# Patient Record
Sex: FEMALE | Employment: UNEMPLOYED | URBAN - METROPOLITAN AREA
[De-identification: names, ages, dates, MRNs, and addresses within clinical notes are randomized per-mention and may not be internally consistent; named-entity substitution may affect disease eponyms.]

---

## 2019-06-25 ENCOUNTER — OFFICE VISIT (OUTPATIENT)
Dept: INTERNAL MEDICINE CLINIC | Age: 24
End: 2019-06-25

## 2019-06-25 VITALS
SYSTOLIC BLOOD PRESSURE: 111 MMHG | TEMPERATURE: 98.5 F | BODY MASS INDEX: 33.43 KG/M2 | DIASTOLIC BLOOD PRESSURE: 74 MMHG | RESPIRATION RATE: 16 BRPM | HEART RATE: 90 BPM | WEIGHT: 208 LBS | HEIGHT: 66 IN | OXYGEN SATURATION: 96 %

## 2019-06-25 DIAGNOSIS — Z87.01 H/O RECURRENT PNEUMONIA: Primary | ICD-10-CM

## 2019-06-25 DIAGNOSIS — J45.909 UNCOMPLICATED ASTHMA, UNSPECIFIED ASTHMA SEVERITY, UNSPECIFIED WHETHER PERSISTENT: ICD-10-CM

## 2019-06-25 RX ORDER — ALBUTEROL SULFATE 90 UG/1
AEROSOL, METERED RESPIRATORY (INHALATION)
COMMUNITY
End: 2019-07-23 | Stop reason: SDUPTHER

## 2019-06-25 RX ORDER — MONTELUKAST SODIUM 10 MG/1
10 TABLET ORAL DAILY
COMMUNITY
End: 2019-07-23 | Stop reason: SDUPTHER

## 2019-06-25 RX ORDER — FLUTICASONE PROPIONATE AND SALMETEROL 250; 50 UG/1; UG/1
1 POWDER RESPIRATORY (INHALATION) EVERY 12 HOURS
COMMUNITY
End: 2019-06-25 | Stop reason: SDUPTHER

## 2019-06-25 RX ORDER — NORGESTIMATE AND ETHINYL ESTRADIOL 0.25-0.035
1 KIT ORAL DAILY
COMMUNITY
End: 2019-07-19 | Stop reason: SDUPTHER

## 2019-06-25 RX ORDER — FLUTICASONE PROPIONATE AND SALMETEROL 250; 50 UG/1; UG/1
1 POWDER RESPIRATORY (INHALATION) EVERY 12 HOURS
Qty: 1 EACH | Refills: 3 | Status: SHIPPED | OUTPATIENT
Start: 2019-06-25 | End: 2019-07-23 | Stop reason: SDUPTHER

## 2019-06-25 NOTE — PROGRESS NOTES
Chief Complaint   Patient presents with    Asthma     Establishing with new PCP. 1. Have you been to the ER, urgent care clinic since your last visit? Hospitalized since your last visit? No    2. Have you seen or consulted any other health care providers outside of the [address] [place] [address] since your last visit? Include any pap smears or colon screening.  No     3 most recent PHQ Screens 6/25/2019   Little interest or pleasure in doing things Not at all   Feeling down, depressed, irritable, or hopeless Not at all   Total Score PHQ 2 0

## 2019-06-29 NOTE — PROGRESS NOTES
HISTORY OF PRESENT ILLNESS  Olga Aleman is a 24 y.o. female. HPI   Pt is here to establish care and for a referral to pulmonology for recurrent pneumonia. She has been told she has asthma and has been on advair for awhile but the past year she has had pneumonia 3x and the ER advised her she needed to find a PCP and see a pulmonologist. She currently denies any symptoms other than a slight wheeze. Allergies   Allergen Reactions    Amoxicillin Rash       Current Outpatient Medications   Medication Sig    albuterol (VENTOLIN HFA) 90 mcg/actuation inhaler Take  by inhalation.  montelukast (SINGULAIR) 10 mg tablet Take 10 mg by mouth daily.  norgestimate-ethinyl estradiol (SPRINTEC, 28,) 0.25-35 mg-mcg tab Take 1 Tab by mouth daily.  fluticasone propion-salmeterol (ADVAIR DISKUS) 250-50 mcg/dose diskus inhaler Take 1 Puff by inhalation every twelve (12) hours. No current facility-administered medications for this visit. Review of Systems   Constitutional: Negative. Negative for chills, fever and malaise/fatigue. HENT: Negative. Negative for congestion, ear pain, sore throat and tinnitus. Eyes: Negative. Negative for blurred vision, double vision and photophobia. Respiratory: Positive for cough and wheezing. Negative for shortness of breath. Cardiovascular: Negative. Negative for chest pain, palpitations and leg swelling. Gastrointestinal: Negative. Negative for abdominal pain, heartburn, nausea and vomiting. Genitourinary: Negative. Negative for dysuria, frequency, hematuria and urgency. Musculoskeletal: Negative. Negative for back pain, joint pain, myalgias and neck pain. Skin: Negative. Negative for itching and rash. Neurological: Negative. Negative for dizziness, tingling, tremors and headaches. Endo/Heme/Allergies: Positive for environmental allergies. Psychiatric/Behavioral: Negative. Negative for depression and memory loss.  The patient is not nervous/anxious and does not have insomnia. Visit Vitals  /74 (BP 1 Location: Right arm, BP Patient Position: Sitting)   Pulse 90   Temp 98.5 °F (36.9 °C) (Oral)   Resp 16   Ht 5' 6\" (1.676 m)   Wt 208 lb (94.3 kg)   SpO2 96%   BMI 33.57 kg/m²       Physical Exam   Constitutional: She is oriented to person, place, and time. She appears well-developed and well-nourished. No distress. HENT:   Head: Normocephalic and atraumatic. Right Ear: External ear normal.   Left Ear: External ear normal.   Nose: Nose normal.   Mouth/Throat: Oropharynx is clear and moist. No oropharyngeal exudate. Cardiovascular: Normal rate, regular rhythm and normal heart sounds. Pulmonary/Chest: Effort normal. No respiratory distress. She has wheezes (diffuse). She has no rales. Neurological: She is alert and oriented to person, place, and time. Skin: Skin is warm and dry. She is not diaphoretic. Psychiatric: She has a normal mood and affect. Her behavior is normal.       ASSESSMENT and PLAN    ICD-10-CM ICD-9-CM    1. H/O recurrent pneumonia Z87.01 V12.61 REFERRAL TO PULMONARY DISEASE   2. Uncomplicated asthma, unspecified asthma severity, unspecified whether persistent J45.909 493.90 fluticasone propion-salmeterol (ADVAIR DISKUS) 250-50 mcg/dose diskus inhaler     Follow-up and Dispositions    · Return if symptoms worsen or fail to improve. Pt expressed understanding of visit summary and plans for any follow ups or referrals as well as any medications prescribed.

## 2019-06-29 NOTE — PATIENT INSTRUCTIONS
Pneumonia: Care Instructions  Your Care Instructions    Pneumonia is an infection of the lungs. Most cases are caused by infections from bacteria or viruses. Pneumonia may be mild or very severe. If it is caused by bacteria, you will be treated with antibiotics. It may take a few weeks to a few months to recover fully from pneumonia, depending on how sick you were and whether your overall health is good. Follow-up care is a key part of your treatment and safety. Be sure to make and go to all appointments, and call your doctor if you are having problems. It's also a good idea to know your test results and keep a list of the medicines you take. How can you care for yourself at home? · Take your antibiotics exactly as directed. Do not stop taking the medicine just because you are feeling better. You need to take the full course of antibiotics. · Take your medicines exactly as prescribed. Call your doctor if you think you are having a problem with your medicine. · Get plenty of rest and sleep. You may feel weak and tired for a while, but your energy level will improve with time. · To prevent dehydration, drink plenty of fluids, enough so that your urine is light yellow or clear like water. Choose water and other caffeine-free clear liquids until you feel better. If you have kidney, heart, or liver disease and have to limit fluids, talk with your doctor before you increase the amount of fluids you drink. · Take care of your cough so you can rest. A cough that brings up mucus from your lungs is common with pneumonia. It is one way your body gets rid of the infection. But if coughing keeps you from resting or causes severe fatigue and chest-wall pain, talk to your doctor. He or she may suggest that you take a medicine to reduce the cough. · Use a vaporizer or humidifier to add moisture to your bedroom. Follow the directions for cleaning the machine. · Do not smoke or allow others to smoke around you.  Smoke will make your cough last longer. If you need help quitting, talk to your doctor about stop-smoking programs and medicines. These can increase your chances of quitting for good. · Take an over-the-counter pain medicine, such as acetaminophen (Tylenol), ibuprofen (Advil, Motrin), or naproxen (Aleve). Read and follow all instructions on the label. · Do not take two or more pain medicines at the same time unless the doctor told you to. Many pain medicines have acetaminophen, which is Tylenol. Too much acetaminophen (Tylenol) can be harmful. · If you were given a spirometer to measure how well your lungs are working, use it as instructed. This can help your doctor tell how your recovery is going. · To prevent pneumonia in the future, talk to your doctor about getting a flu vaccine (once a year) and a pneumococcal vaccine (one time only for most people). When should you call for help? Call 911 anytime you think you may need emergency care. For example, call if:    · You have severe trouble breathing.    Call your doctor now or seek immediate medical care if:    · You cough up dark brown or bloody mucus (sputum).     · You have new or worse trouble breathing.     · You are dizzy or lightheaded, or you feel like you may faint.    Watch closely for changes in your health, and be sure to contact your doctor if:    · You have a new or higher fever.     · You are coughing more deeply or more often.     · You are not getting better after 2 days (48 hours).     · You do not get better as expected. Where can you learn more? Go to http://amy-jon.info/. Enter [id number] in the search box to learn more about \"Pneumonia: Care Instructions. \"  Current as of: September 5, 2018  Content Version: 11.9  © 8953-1388 [employer]. Care instructions adapted under license by [employer] (which disclaims liability or warranty for this information).  If you have questions about a medical condition or this instruction, always ask your healthcare professional. [place] [address] any warranty or liability for your use of this information.

## 2019-07-19 RX ORDER — NORGESTIMATE AND ETHINYL ESTRADIOL 0.25-0.035
1 KIT ORAL DAILY
Qty: 1 PACKAGE | Refills: 3 | Status: SHIPPED | OUTPATIENT
Start: 2019-07-19 | End: 2019-11-07 | Stop reason: SDUPTHER

## 2019-07-23 ENCOUNTER — OFFICE VISIT (OUTPATIENT)
Dept: INTERNAL MEDICINE CLINIC | Age: 24
End: 2019-07-23

## 2019-07-23 VITALS
TEMPERATURE: 98.2 F | WEIGHT: 203 LBS | OXYGEN SATURATION: 95 % | DIASTOLIC BLOOD PRESSURE: 72 MMHG | SYSTOLIC BLOOD PRESSURE: 105 MMHG | HEART RATE: 97 BPM | BODY MASS INDEX: 32.62 KG/M2 | HEIGHT: 66 IN | RESPIRATION RATE: 16 BRPM

## 2019-07-23 DIAGNOSIS — J45.909 UNCOMPLICATED ASTHMA, UNSPECIFIED ASTHMA SEVERITY, UNSPECIFIED WHETHER PERSISTENT: ICD-10-CM

## 2019-07-23 DIAGNOSIS — K59.00 CONSTIPATION, UNSPECIFIED CONSTIPATION TYPE: Primary | ICD-10-CM

## 2019-07-23 RX ORDER — MONTELUKAST SODIUM 10 MG/1
10 TABLET ORAL DAILY
Qty: 30 TAB | Refills: 11 | Status: SHIPPED | OUTPATIENT
Start: 2019-07-23 | End: 2019-07-23 | Stop reason: SDUPTHER

## 2019-07-23 RX ORDER — ALBUTEROL SULFATE 90 UG/1
2 AEROSOL, METERED RESPIRATORY (INHALATION)
Qty: 1 INHALER | Refills: 11 | Status: SHIPPED | OUTPATIENT
Start: 2019-07-23 | End: 2020-07-27 | Stop reason: SDUPTHER

## 2019-07-23 RX ORDER — LACTULOSE 10 G/15ML
10 SOLUTION ORAL; RECTAL 2 TIMES DAILY
Qty: 480 ML | Refills: 1 | Status: SHIPPED | OUTPATIENT
Start: 2019-07-23 | End: 2020-04-06

## 2019-07-23 NOTE — PROGRESS NOTES
Chief Complaint   Patient presents with    Constipation     Took Miralax, working but still feels like she has stool in bowels.  Cough     Still waiting to go to Pulmonary, gave wrong fax number. Coughing and having SOB. 1. Have you been to the ER, urgent care clinic since your last visit? Hospitalized since your last visit? No    2. Have you seen or consulted any other health care providers outside of the [address] [place] [address] since your last visit? Include any pap smears or colon screening.  No

## 2019-07-24 RX ORDER — FLUTICASONE PROPIONATE AND SALMETEROL 250; 50 UG/1; UG/1
1 POWDER RESPIRATORY (INHALATION) EVERY 12 HOURS
Qty: 1 EACH | Refills: 3 | Status: SHIPPED | OUTPATIENT
Start: 2019-07-24 | End: 2020-11-06 | Stop reason: DRUGHIGH

## 2019-07-24 RX ORDER — MONTELUKAST SODIUM 10 MG/1
10 TABLET ORAL DAILY
Qty: 30 TAB | Refills: 11 | Status: SHIPPED | OUTPATIENT
Start: 2019-07-24 | End: 2021-11-15 | Stop reason: SDUPTHER

## 2019-07-26 NOTE — PATIENT INSTRUCTIONS
Constipation: Care Instructions  Your Care Instructions    Constipation means that you have a hard time passing stools (bowel movements). People pass stools from 3 times a day to once every 3 days. What is normal for you may be different. Constipation may occur with pain in the rectum and cramping. The pain may get worse when you try to pass stools. Sometimes there are small amounts of bright red blood on toilet paper or the surface of stools. This is because of enlarged veins near the rectum (hemorrhoids). A few changes in your diet and lifestyle may help you avoid ongoing constipation. Your doctor may also prescribe medicine to help loosen your stool. Some medicines can cause constipation. These include pain medicines and antidepressants. Tell your doctor about all the medicines you take. Your doctor may want to make a medicine change to ease your symptoms. Follow-up care is a key part of your treatment and safety. Be sure to make and go to all appointments, and call your doctor if you are having problems. It's also a good idea to know your test results and keep a list of the medicines you take. How can you care for yourself at home? · Drink plenty of fluids, enough so that your urine is light yellow or clear like water. If you have kidney, heart, or liver disease and have to limit fluids, talk with your doctor before you increase the amount of fluids you drink. · Include high-fiber foods in your diet each day. These include fruits, vegetables, beans, and whole grains. · Get at least 30 minutes of exercise on most days of the week. Walking is a good choice. You also may want to do other activities, such as running, swimming, cycling, or playing tennis or team sports. · Take a fiber supplement, such as Citrucel or Metamucil, every day. Read and follow all instructions on the label. · Schedule time each day for a bowel movement. A daily routine may help.  Take your time having your bowel movement. · Support your feet with a small step stool when you sit on the toilet. This helps flex your hips and places your pelvis in a squatting position. · Your doctor may recommend an over-the-counter laxative to relieve your constipation. Examples are Milk of Magnesia and MiraLax. Read and follow all instructions on the label. Do not use laxatives on a long-term basis. When should you call for help? Call your doctor now or seek immediate medical care if:    · You have new or worse belly pain.     · You have new or worse nausea or vomiting.     · You have blood in your stools.    Watch closely for changes in your health, and be sure to contact your doctor if:    · Your constipation is getting worse.     · You do not get better as expected. Where can you learn more? Go to http://amy-jon.info/. Enter 21 [id number] in the search box to learn more about \"Constipation: Care Instructions. \"  Current as of: September 23, 2018  Content Version: 12.1  © 6309-7981 [employer]. Care instructions adapted under license by [employer] (which disclaims liability or warranty for this information). If you have questions about a medical condition or this instruction, always ask your healthcare professional. [address] any warranty or liability for your use of this information.

## 2019-07-26 NOTE — PROGRESS NOTES
HISTORY OF PRESENT ILLNESS  Tatiana Jaqueline Merlin is a 24 y.o. female. HPI   Pt is here for constipation for the past 1-2 weeks. She has been taking mirilax every few days and increasing her water with no relief. Has not tried anything else. Denies abd or back pain, fever, chills, rash, HA, dizziness, bloody or black stools, and N/V. She also needs a refill on her inhaler and still has not heard from pulmonology. Allergies   Allergen Reactions    Amoxicillin Rash       Current Outpatient Medications   Medication Sig    lactulose (CHRONULAC) 10 gram/15 mL solution Take 15 mL by mouth two (2) times a day.  albuterol (VENTOLIN HFA) 90 mcg/actuation inhaler Take 2 Puffs by inhalation every four (4) hours as needed for Wheezing.  norgestimate-ethinyl estradiol (SPRINTEC, 28,) 0.25-35 mg-mcg tab Take 1 Tab by mouth daily.  fluticasone propion-salmeterol (ADVAIR DISKUS) 250-50 mcg/dose diskus inhaler Take 1 Puff by inhalation every twelve (12) hours.  montelukast (SINGULAIR) 10 mg tablet Take 1 Tab by mouth daily. No current facility-administered medications for this visit. Review of Systems   Constitutional: Negative. Negative for chills, fever and malaise/fatigue. HENT: Negative. Negative for congestion, ear pain, sore throat and tinnitus. Eyes: Negative. Negative for blurred vision, double vision and photophobia. Respiratory: Positive for cough and wheezing. Negative for shortness of breath. Cardiovascular: Negative. Negative for chest pain, palpitations and leg swelling. Gastrointestinal: Positive for constipation. Negative for abdominal pain, blood in stool, diarrhea, heartburn, melena, nausea and vomiting. Genitourinary: Negative. Negative for dysuria, frequency, hematuria and urgency. Musculoskeletal: Negative. Negative for back pain, joint pain, myalgias and neck pain. Skin: Negative. Negative for itching and rash. Neurological: Negative.   Negative for dizziness, tingling, tremors and headaches. Endo/Heme/Allergies: Positive for environmental allergies. Psychiatric/Behavioral: Negative. Negative for depression and memory loss. The patient is not nervous/anxious and does not have insomnia. Visit Vitals  /72 (BP 1 Location: Left arm, BP Patient Position: Sitting)   Pulse 97   Temp 98.2 °F (36.8 °C) (Oral)   Resp 16   Ht 5' 6\" (1.676 m)   Wt 203 lb (92.1 kg)   SpO2 95%   BMI 32.77 kg/m²       Physical Exam   Constitutional: She is oriented to person, place, and time. She appears well-developed and well-nourished. No distress. HENT:   Head: Normocephalic and atraumatic. Right Ear: External ear normal.   Left Ear: External ear normal.   Nose: Nose normal.   Mouth/Throat: Oropharynx is clear and moist. No oropharyngeal exudate. Cardiovascular: Normal rate, regular rhythm and normal heart sounds. Pulmonary/Chest: Effort normal. No respiratory distress. She has wheezes (diffuse). She has no rales. Abdominal: Soft. There is no tenderness. Neurological: She is alert and oriented to person, place, and time. Skin: Skin is warm and dry. She is not diaphoretic. Psychiatric: She has a normal mood and affect. Her behavior is normal.       ASSESSMENT and PLAN  Will try mirilax daily, increase fiber and water      ICD-10-CM ICD-9-CM    1. Constipation, unspecified constipation type K59.00 564.00 lactulose (CHRONULAC) 10 gram/15 mL solution   2. Uncomplicated asthma, unspecified asthma severity, unspecified whether persistent J45.909 493.90 albuterol (VENTOLIN HFA) 90 mcg/actuation inhaler      DISCONTINUED: montelukast (SINGULAIR) 10 mg tablet     Follow-up and Dispositions    · Return if symptoms worsen or fail to improve. Pt expressed understanding of visit summary and plans for any follow ups or referrals as well as any medications prescribed.

## 2019-11-07 ENCOUNTER — PATIENT MESSAGE (OUTPATIENT)
Dept: INTERNAL MEDICINE CLINIC | Age: 24
End: 2019-11-07

## 2019-11-07 NOTE — TELEPHONE ENCOUNTER
Please see encounters for messages. Pt has been requesting appt for medication refill with no response these messages goes to the .  I did sent pt a mychart message back but she may need a refill until she is able to get in

## 2019-11-08 RX ORDER — NORGESTIMATE AND ETHINYL ESTRADIOL 0.25-0.035
1 KIT ORAL DAILY
Qty: 1 PACKAGE | Refills: 0 | Status: SHIPPED | OUTPATIENT
Start: 2019-11-08 | End: 2019-12-12 | Stop reason: SDUPTHER

## 2019-12-04 ENCOUNTER — OFFICE VISIT (OUTPATIENT)
Dept: INTERNAL MEDICINE CLINIC | Age: 24
End: 2019-12-04

## 2019-12-04 VITALS
OXYGEN SATURATION: 97 % | DIASTOLIC BLOOD PRESSURE: 69 MMHG | RESPIRATION RATE: 16 BRPM | TEMPERATURE: 99.4 F | HEART RATE: 115 BPM | BODY MASS INDEX: 33.59 KG/M2 | HEIGHT: 66 IN | WEIGHT: 209 LBS | SYSTOLIC BLOOD PRESSURE: 106 MMHG

## 2019-12-04 DIAGNOSIS — R68.89 FLU-LIKE SYMPTOMS: Primary | ICD-10-CM

## 2019-12-04 DIAGNOSIS — J40 BRONCHITIS: ICD-10-CM

## 2019-12-04 LAB
FLUAV+FLUBV AG NOSE QL IA.RAPID: NEGATIVE POS/NEG
FLUAV+FLUBV AG NOSE QL IA.RAPID: NEGATIVE POS/NEG
VALID INTERNAL CONTROL?: YES

## 2019-12-04 RX ORDER — AZITHROMYCIN 250 MG/1
TABLET, FILM COATED ORAL
Qty: 6 TAB | Refills: 0 | Status: SHIPPED | OUTPATIENT
Start: 2019-12-04 | End: 2020-04-06

## 2019-12-04 NOTE — PROGRESS NOTES
Chief Complaint   Patient presents with    Cold Symptoms     Started Saturday, sinus congestion and pressure. Cough, sometimes productive. Fever and chills, bodyaches. 1. Have you been to the ER, urgent care clinic since your last visit? Hospitalized since your last visit? No    2. Have you seen or consulted any other health care providers outside of the [address] [place] [address] since your last visit? Include any pap smears or colon screening.  No

## 2019-12-04 NOTE — LETTER
NOTIFICATION RETURN TO WORK  
 
12/4/2019 5:33 PM 
 
Ms. Bernadine Decker [address] [address] To Whom It May Concern: 
 
Remedios Miranda is currently under the care of [address] [place] [address]. She will return to work on: 12/06/2019. If there are questions or concerns please have the patient contact our office.  
 
 
 
Sincerely, 
 
 
Beryl Gallegos PA-C

## 2019-12-08 NOTE — PATIENT INSTRUCTIONS

## 2019-12-08 NOTE — PROGRESS NOTES
HISTORY OF PRESENT ILLNESS  Remedios Caceres is a 21 y.o. female. HPI   Pr c/o sinus congestion, fever, chills, cough, wheezing, and myalgias x 3 days. Denies CP, SOB, otalgia, dizziness, and N/V/D    Allergies   Allergen Reactions    Amoxicillin Rash       Current Outpatient Medications   Medication Sig    azithromycin (ZITHROMAX Z-WEI) 250 mg tablet Take two tablets today then one tablet daily    norgestimate-ethinyl estradiol (SPRINTEC, 28,) 0.25-35 mg-mcg tab Take 1 Tab by mouth daily.  fluticasone propion-salmeterol (ADVAIR DISKUS) 250-50 mcg/dose diskus inhaler Take 1 Puff by inhalation every twelve (12) hours.  montelukast (SINGULAIR) 10 mg tablet Take 1 Tab by mouth daily.  albuterol (VENTOLIN HFA) 90 mcg/actuation inhaler Take 2 Puffs by inhalation every four (4) hours as needed for Wheezing.  lactulose (CHRONULAC) 10 gram/15 mL solution Take 15 mL by mouth two (2) times a day. No current facility-administered medications for this visit. Review of Systems   Constitutional: Positive for chills, fever and malaise/fatigue. HENT: Positive for congestion. Negative for ear pain and sore throat. Respiratory: Positive for cough and wheezing. Negative for sputum production and shortness of breath. Cardiovascular: Negative. Negative for chest pain, palpitations and leg swelling. Genitourinary: Negative. Musculoskeletal: Negative. Skin: Negative. Negative for itching and rash. Neurological: Negative. Negative for headaches. Psychiatric/Behavioral: Negative. Visit Vitals  /69 (BP 1 Location: Left arm, BP Patient Position: Sitting)   Pulse (!) 115   Temp 99.4 °F (37.4 °C) (Oral)   Resp 16   Ht 5' 6\" (1.676 m)   Wt 209 lb (94.8 kg)   SpO2 97%   BMI 33.73 kg/m²       Physical Exam  Constitutional:       General: She is not in acute distress. Appearance: She is well-developed. She is not diaphoretic. HENT:      Head: Normocephalic and atraumatic. Right Ear: Tympanic membrane, ear canal and external ear normal.      Left Ear: Tympanic membrane, ear canal and external ear normal.      Nose: Mucosal edema and rhinorrhea present. Right Sinus: No maxillary sinus tenderness or frontal sinus tenderness. Left Sinus: No maxillary sinus tenderness or frontal sinus tenderness. Mouth/Throat:      Pharynx: Uvula midline. Posterior oropharyngeal erythema present. No oropharyngeal exudate. Cardiovascular:      Rate and Rhythm: Normal rate and regular rhythm. Heart sounds: Normal heart sounds. No murmur. No friction rub. No gallop. Pulmonary:      Effort: Pulmonary effort is normal. No respiratory distress. Breath sounds: Wheezing present. No rales. Lymphadenopathy:      Cervical: No cervical adenopathy. Skin:     General: Skin is warm and dry. Neurological:      Mental Status: She is alert and oriented to person, place, and time. Psychiatric:         Behavior: Behavior normal.         ASSESSMENT and PLAN    ICD-10-CM ICD-9-CM    1. Flu-like symptoms R68.89 780.99 AMB POC MASHA INFLUENZA A/B TEST   2. Bronchitis J40 490 azithromycin (ZITHROMAX Z-WEI) 250 mg tablet     Follow-up and Dispositions    · Return if symptoms worsen or fail to improve. Pt expressed understanding of visit summary and plans for any follow ups or referrals as well as any medications prescribed.

## 2019-12-12 RX ORDER — NORGESTIMATE AND ETHINYL ESTRADIOL 0.25-0.035
1 KIT ORAL DAILY
Qty: 1 PACKAGE | Refills: 0 | Status: SHIPPED | OUTPATIENT
Start: 2019-12-12 | End: 2020-01-08 | Stop reason: SDUPTHER

## 2019-12-19 ENCOUNTER — OFFICE VISIT (OUTPATIENT)
Dept: FAMILY MEDICINE CLINIC | Age: 24
End: 2019-12-19

## 2019-12-19 VITALS
HEART RATE: 110 BPM | BODY MASS INDEX: 35.2 KG/M2 | WEIGHT: 219 LBS | SYSTOLIC BLOOD PRESSURE: 113 MMHG | RESPIRATION RATE: 20 BRPM | HEIGHT: 66 IN | DIASTOLIC BLOOD PRESSURE: 76 MMHG | OXYGEN SATURATION: 99 % | TEMPERATURE: 96.5 F

## 2019-12-19 DIAGNOSIS — R50.9 FEVER, UNSPECIFIED FEVER CAUSE: ICD-10-CM

## 2019-12-19 DIAGNOSIS — J11.1 INFLUENZA: Primary | ICD-10-CM

## 2019-12-19 LAB
QUICKVUE INFLUENZA TEST: POSITIVE
VALID INTERNAL CONTROL?: YES

## 2019-12-19 RX ORDER — OSELTAMIVIR PHOSPHATE 75 MG/1
75 CAPSULE ORAL 2 TIMES DAILY
Qty: 10 CAP | Refills: 0 | Status: SHIPPED | OUTPATIENT
Start: 2019-12-19 | End: 2019-12-24

## 2019-12-19 NOTE — PATIENT INSTRUCTIONS
Influenza (Flu): Care Instructions  Your Care Instructions    Influenza (flu) is an infection in the lungs and breathing passages. It is caused by the influenza virus. There are different strains, or types, of the flu virus from year to year. Unlike the common cold, the flu comes on suddenly and the symptoms, such as a cough, congestion, fever, chills, fatigue, aches, and pains, are more severe. These symptoms may last up to 10 days. Although the flu can make you feel very sick, it usually doesn't cause serious health problems. Home treatment is usually all you need for flu symptoms. But your doctor may prescribe antiviral medicine to prevent other health problems, such as pneumonia, from developing. Older people and those who have a long-term health condition, such as lung disease, are most at risk for having pneumonia or other health problems. Follow-up care is a key part of your treatment and safety. Be sure to make and go to all appointments, and call your doctor if you are having problems. It's also a good idea to know your test results and keep a list of the medicines you take. How can you care for yourself at home? · Get plenty of rest.  · Drink plenty of fluids, enough so that your urine is light yellow or clear like water. If you have kidney, heart, or liver disease and have to limit fluids, talk with your doctor before you increase the amount of fluids you drink. · Take an over-the-counter pain medicine if needed, such as acetaminophen (Tylenol), ibuprofen (Advil, Motrin), or naproxen (Aleve), to relieve fever, headache, and muscle aches. Read and follow all instructions on the label. No one younger than 20 should take aspirin. It has been linked to Reye syndrome, a serious illness. · Do not smoke. Smoking can make the flu worse. If you need help quitting, talk to your doctor about stop-smoking programs and medicines. These can increase your chances of quitting for good.   · Breathe moist air from a hot shower or from a sink filled with hot water to help clear a stuffy nose. · Before you use cough and cold medicines, check the label. These medicines may not be safe for young children or for people with certain health problems. · If the skin around your nose and lips becomes sore, put some petroleum jelly on the area. · To ease coughing:  ? Drink fluids to soothe a scratchy throat. ? Suck on cough drops or plain hard candy. ? Take an over-the-counter cough medicine that contains dextromethorphan to help you get some sleep. Read and follow all instructions on the label. ? Raise your head at night with an extra pillow. This may help you rest if coughing keeps you awake. · Take any prescribed medicine exactly as directed. Call your doctor if you think you are having a problem with your medicine. To avoid spreading the flu  · Wash your hands regularly, and keep your hands away from your face. · Stay home from school, work, and other public places until you are feeling better and your fever has been gone for at least 24 hours. The fever needs to have gone away on its own without the help of medicine. · Ask people living with you to talk to their doctors about preventing the flu. They may get antiviral medicine to keep from getting the flu from you. · To prevent the flu in the future, get a flu vaccine every fall. Encourage people living with you to get the vaccine. · Cover your mouth when you cough or sneeze. When should you call for help? Call 911 anytime you think you may need emergency care.  For example, call if:    · You have severe trouble breathing.    Call your doctor now or seek immediate medical care if:    · You have new or worse trouble breathing.     · You seem to be getting much sicker.     · You feel very sleepy or confused.     · You have a new or higher fever.     · You get a new rash.    Watch closely for changes in your health, and be sure to contact your doctor if:    · You begin to get better and then get worse.     · You are not getting better after 1 week. Where can you learn more? Go to http://amy-jon.info/. Enter E070 in the search box to learn more about \"Influenza (Flu): Care Instructions. \"  Current as of: June 9, 2019  Content Version: 12.2  © 5857-2106 [employer]. Care instructions adapted under license by [employer] (which disclaims liability or warranty for this information). If you have questions about a medical condition or this instruction, always ask your healthcare professional. [address] any warranty or liability for your use of this information.

## 2019-12-19 NOTE — PROGRESS NOTES
Kim Conner is a 21 y.o. female (: 1995) presenting to address:    Chief Complaint   Patient presents with    Cold Symptoms    Fever    Generalized Body Aches       Vitals:    19 1153   BP: 113/76   Pulse: (!) 110   Resp: 20   Temp: 96.5 °F (35.8 °C)   TempSrc: Oral   SpO2: 99%   Weight: 219 lb (99.3 kg)   Height: 5' 6\" (1.676 m)   PainSc:   0 - No pain   LMP: 2019       Hearing/Vision:   No exam data present    Learning Assessment:     Learning Assessment 2019   PRIMARY LEARNER Patient   PRIMARY LANGUAGE ENGLISH   LEARNER PREFERENCE PRIMARY READING   ANSWERED BY patient   RELATIONSHIP SELF     Depression Screening:     3 most recent PHQ Screens 2019   Little interest or pleasure in doing things Not at all   Feeling down, depressed, irritable, or hopeless Not at all   Total Score PHQ 2 0     Fall Risk Assessment:   No flowsheet data found. Abuse Screening:   No flowsheet data found. Coordination of Care Questionaire:   1. Have you been to the ER, urgent care clinic since your last visit? Hospitalized since your last visit? NO    2. Have you seen or consulted any other health care providers outside of the [address] [place] [address] since your last visit? Include any pap smears or colon screening. NO    Advanced Directive:   1. Do you have an Advanced Directive? NO    2. Would you like information on Advanced Directives?  NO

## 2019-12-19 NOTE — LETTER
NOTIFICATION RETURN TO WORK / SCHOOL 
 
12/19/2019 12:22 PM 
 
[facility] [address] [address] To Whom It May Concern: 
 
Cony Gonzalez is currently under the care of [address] [place] [address]. She will return to work/school on: 12/22/19 If there are questions or concerns please have the patient contact our office.  
 
 
 
Sincerely, 
 
 
Sea Esquivel NP

## 2019-12-19 NOTE — PROGRESS NOTES
Subjective: Lizbeth Pink is a 21 y.o. female who present complaining of flu-like symptoms: fevers, chills, myalgias, congestion, sore throat and cough for 2 days. She denies dyspnea or wheezing. Smoking status: non-smoker. Flu vaccine status: not vaccinated this season. Relevant PMH: No pertinent additional PMH. Review of Systems  Pertinent items are noted in HPI. Current Outpatient Medications   Medication Sig Dispense Refill    oseltamivir (TAMIFLU) 75 mg capsule Take 1 Cap by mouth two (2) times a day for 5 days. 10 Cap 0    norgestimate-ethinyl estradiol (SPRINTEC, 28,) 0.25-35 mg-mcg tab Take 1 Tab by mouth daily. 1 Package 0    fluticasone propion-salmeterol (ADVAIR DISKUS) 250-50 mcg/dose diskus inhaler Take 1 Puff by inhalation every twelve (12) hours. 1 Each 3    montelukast (SINGULAIR) 10 mg tablet Take 1 Tab by mouth daily. 30 Tab 11    albuterol (VENTOLIN HFA) 90 mcg/actuation inhaler Take 2 Puffs by inhalation every four (4) hours as needed for Wheezing. 1 Inhaler 11    azithromycin (ZITHROMAX Z-WEI) 250 mg tablet Take two tablets today then one tablet daily 6 Tab 0    lactulose (CHRONULAC) 10 gram/15 mL solution Take 15 mL by mouth two (2) times a day. 480 mL 1     Allergies   Allergen Reactions    Amoxicillin Rash       Objective:     Visit Vitals  /76 (BP 1 Location: Right arm, BP Patient Position: Sitting)   Pulse (!) 110   Temp 96.5 °F (35.8 °C) (Oral)   Resp 20   Ht 5' 6\" (1.676 m)   Wt 219 lb (99.3 kg)   LMP 12/08/2019   SpO2 99%   BMI 35.35 kg/m²       Appears moderately ill but not toxic; temperature as noted in vitals. Ears normal.   Throat and pharynx normal.    Neck supple. No adenopathy in the neck. Sinuses non tender. The chest is clear.     Assessment/Plan:   Influenza very likely from clinical presentation and seasonal pattern  Considerations for specific influenza anti-viral therapy: symptoms present < 48 hours, antiviral therapy is indicated  Symptomatic therapy suggested: rest, increase fluids, gargle prn for sore throat, use mist of vaporizer prn and call prn if symptoms persist or worsen. Call or return to clinic prn if these symptoms worsen or fail to improve as anticipated. Encounter Diagnoses   Name Primary?  Influenza Yes    Fever, unspecified fever cause      Orders Placed This Encounter    AMB POC RAPID INFLUENZA TEST    oseltamivir (TAMIFLU) 75 mg capsule   .

## 2020-04-06 ENCOUNTER — VIRTUAL VISIT (OUTPATIENT)
Dept: FAMILY MEDICINE CLINIC | Facility: CLINIC | Age: 25
End: 2020-04-06

## 2020-04-06 DIAGNOSIS — N94.6 DYSMENORRHEA: Primary | ICD-10-CM

## 2020-04-06 RX ORDER — NORGESTIMATE AND ETHINYL ESTRADIOL 0.25-0.035
1 KIT ORAL DAILY
Qty: 1 PACKAGE | Refills: 2 | Status: CANCELLED | OUTPATIENT
Start: 2020-04-06

## 2020-04-06 RX ORDER — NORGESTIMATE AND ETHINYL ESTRADIOL 0.25-0.035
KIT ORAL
Qty: 4 PACKAGE | Refills: 3 | Status: SHIPPED | OUTPATIENT
Start: 2020-04-06 | End: 2021-04-21 | Stop reason: SDUPTHER

## 2020-04-06 NOTE — PROGRESS NOTES
HISTORY OF PRESENT ILLNESS  Remedios Hernandez is a 21 y.o. female. HPI   Pt being seen via doxy. me for refills on her BCP for her irregular cycles. She is doing well on sprintec and would like to refill that. She skips the placebo so will write script accordingly. She has no other complaints or concerns today and her asthma is well controlled currently on her meds. Allergies   Allergen Reactions    Amoxicillin Rash       Current Outpatient Medications   Medication Sig    norgestimate-ethinyl estradioL (Sprintec, 28,) 0.25-35 mg-mcg tab Take 1 tab daily and skip placebo    fluticasone propion-salmeterol (ADVAIR DISKUS) 250-50 mcg/dose diskus inhaler Take 1 Puff by inhalation every twelve (12) hours.  montelukast (SINGULAIR) 10 mg tablet Take 1 Tab by mouth daily.  albuterol (VENTOLIN HFA) 90 mcg/actuation inhaler Take 2 Puffs by inhalation every four (4) hours as needed for Wheezing. No current facility-administered medications for this visit. Review of Systems   Constitutional: Negative. Negative for chills, fever and malaise/fatigue. HENT: Negative. Negative for congestion, ear pain, sore throat and tinnitus. Eyes: Negative. Negative for blurred vision, double vision and photophobia. Respiratory: Negative. Negative for cough, shortness of breath and wheezing. Cardiovascular: Negative. Negative for chest pain, palpitations and leg swelling. Gastrointestinal: Negative. Negative for abdominal pain, heartburn, nausea and vomiting. Genitourinary: Negative. Negative for dysuria, frequency, hematuria and urgency. Musculoskeletal: Negative. Negative for back pain, joint pain, myalgias and neck pain. Skin: Negative. Negative for itching and rash. Neurological: Negative. Negative for dizziness, tingling, tremors and headaches. Psychiatric/Behavioral: Negative. Negative for depression and memory loss. The patient is not nervous/anxious and does not have insomnia. Physical Exam  Constitutional:       General: She is not in acute distress. Appearance: Normal appearance. She is not ill-appearing. HENT:      Head: Normocephalic and atraumatic. Neurological:      Mental Status: She is alert and oriented to person, place, and time. Psychiatric:         Mood and Affect: Mood normal.         Behavior: Behavior normal.         ASSESSMENT and PLAN    ICD-10-CM ICD-9-CM    1. Dysmenorrhea N94.6 625.3 norgestimate-ethinyl estradioL (Sprintec, 28,) 0.25-35 mg-mcg tab     Follow-up and Dispositions    · Return in about 1 year (around 4/6/2021) for follow up. Pt expressed understanding of visit summary and plans for any follow ups or referrals as well as any medications prescribed. Seen by synchronous (real-time) audio-video technology via doxy. me on 04/06/2020     . The patient is aware that this patient-initiated Telehealth encounter is a billable service, with coverage as determined by his or her insurance carrier. He/She is aware that they may receive a bill and has provided verbal consent to proceed: Yes        I was in the office while conducting this encounter.

## 2020-04-06 NOTE — PATIENT INSTRUCTIONS
Female Reproductive Organs, Front View: Anatomy Sketch Current as of: November 7, 2019Content Version: 12.4 © 5341-5675 [employer]wise[employer]orporated. Care instructions adapted under license by [employer] (which disclaims liability or warranty for this information). If you have questions about a medical condition or this instruction, always ask your healthcare professional. [address] any warranty or liability for your use of this information.

## 2020-07-27 DIAGNOSIS — J45.909 UNCOMPLICATED ASTHMA, UNSPECIFIED ASTHMA SEVERITY, UNSPECIFIED WHETHER PERSISTENT: ICD-10-CM

## 2020-07-31 RX ORDER — ALBUTEROL SULFATE 90 UG/1
2 AEROSOL, METERED RESPIRATORY (INHALATION)
Qty: 1 INHALER | Refills: 5 | Status: SHIPPED | OUTPATIENT
Start: 2020-07-31 | End: 2020-12-17

## 2020-11-06 ENCOUNTER — VIRTUAL VISIT (OUTPATIENT)
Dept: FAMILY MEDICINE CLINIC | Age: 25
End: 2020-11-06
Payer: COMMERCIAL

## 2020-11-06 DIAGNOSIS — R23.4 SCAB: ICD-10-CM

## 2020-11-06 DIAGNOSIS — J45.909 MODERATE ASTHMA WITHOUT COMPLICATION, UNSPECIFIED WHETHER PERSISTENT: Primary | ICD-10-CM

## 2020-11-06 PROCEDURE — 99213 OFFICE O/P EST LOW 20 MIN: CPT | Performed by: PHYSICIAN ASSISTANT

## 2020-11-06 RX ORDER — FLUTICASONE PROPIONATE AND SALMETEROL 500; 50 UG/1; UG/1
1 POWDER RESPIRATORY (INHALATION) 2 TIMES DAILY
Qty: 3 EACH | Refills: 1 | Status: SHIPPED | OUTPATIENT
Start: 2020-11-06 | End: 2020-11-06 | Stop reason: SDUPTHER

## 2020-11-06 RX ORDER — FLUTICASONE PROPIONATE AND SALMETEROL 500; 50 UG/1; UG/1
1 POWDER RESPIRATORY (INHALATION) 2 TIMES DAILY
COMMUNITY
Start: 2019-09-23 | End: 2020-11-06 | Stop reason: SDUPTHER

## 2020-11-06 RX ORDER — FLUTICASONE PROPIONATE AND SALMETEROL 500; 50 UG/1; UG/1
1 POWDER RESPIRATORY (INHALATION) 2 TIMES DAILY
Qty: 3 EACH | Refills: 1 | Status: SHIPPED | OUTPATIENT
Start: 2020-11-06 | End: 2021-10-14 | Stop reason: SDUPTHER

## 2020-11-06 NOTE — PROGRESS NOTES
HISTORY OF PRESENT ILLNESS  Tatiana Katrinka Meigs is a 31 y.o. female. HPI   Pt is being seen via doxy. me for concerns about a sore on her right breast that has been there for 6 weeks. She states it started off red and was not tender then opened up like a sore and bled. There was never pus or warmth to skin. It is starting to heal and has a scab but pt is concerned. Will stop by office this afternoon so I can see it to better assess it. She also needs a refill on her advair    Allergies   Allergen Reactions    Amoxicillin Rash       Current Outpatient Medications   Medication Sig    fluticasone propion-salmeteroL (ADVAIR/WIXELA) 500-50 mcg/dose diskus inhaler Take 1 Puff by inhalation two (2) times a day.  albuterol (Ventolin HFA) 90 mcg/actuation inhaler Take 2 Puffs by inhalation every four (4) hours as needed for Wheezing.  norgestimate-ethinyl estradioL (Sprintec, 28,) 0.25-35 mg-mcg tab Take 1 tab daily and skip placebo    montelukast (SINGULAIR) 10 mg tablet Take 1 Tab by mouth daily. No current facility-administered medications for this visit. Review of Systems   Constitutional: Negative. Negative for chills, fever and malaise/fatigue. HENT: Negative. Negative for congestion, ear pain, sore throat and tinnitus. Eyes: Negative. Negative for blurred vision, double vision and photophobia. Respiratory: Negative. Negative for cough, shortness of breath and wheezing. Cardiovascular: Negative. Negative for chest pain, palpitations and leg swelling. Gastrointestinal: Negative. Negative for abdominal pain, heartburn, nausea and vomiting. Genitourinary: Negative. Negative for dysuria, frequency, hematuria and urgency. Musculoskeletal: Negative. Negative for back pain, joint pain, myalgias and neck pain. Skin: Negative for itching and rash. Sore on right breast   Neurological: Negative. Negative for dizziness, tingling, tremors and headaches. Psychiatric/Behavioral: Negative. Negative for depression and memory loss. The patient is not nervous/anxious and does not have insomnia. Patient-Reported Vitals 11/6/2020   Patient-Reported Pulse 81   Patient-Reported Systolic  888   Patient-Reported Diastolic 78        Physical Exam  Constitutional:       General: She is not in acute distress. Appearance: Normal appearance. She is not ill-appearing. HENT:      Head: Normocephalic and atraumatic. Pulmonary:      Effort: No respiratory distress. Skin:         Neurological:      Mental Status: She is alert and oriented to person, place, and time. Psychiatric:         Mood and Affect: Mood normal.         Thought Content: Thought content normal.         Judgment: Judgment normal.         ASSESSMENT and PLAN    ICD-10-CM ICD-9-CM    1. Moderate asthma without complication, unspecified whether persistent  J45.909 493.90 fluticasone propion-salmeteroL (ADVAIR/WIXELA) 500-50 mcg/dose diskus inhaler      DISCONTINUED: fluticasone propion-salmeteroL (ADVAIR/WIXELA) 500-50 mcg/dose diskus inhaler   2. Scab  R23.4 782.8      Follow-up and Dispositions    · Return if symptoms worsen or fail to improve. Pt expressed understanding of visit summary and plans for any follow ups or referrals as well as any medications prescribed. Seen by synchronous (real-time) audio-video technology via doxy. me on 11/06/2020    The patient is aware that this patient-initiated Telehealth encounter is a billable service, with coverage as determined by his or her insurance carrier. He/She is aware that they may receive a bill and has provided verbal consent to proceed: Yes        I was in the office while conducting this encounter.

## 2020-12-17 DIAGNOSIS — J45.909 UNCOMPLICATED ASTHMA, UNSPECIFIED ASTHMA SEVERITY, UNSPECIFIED WHETHER PERSISTENT: ICD-10-CM

## 2020-12-17 RX ORDER — ALBUTEROL SULFATE 90 UG/1
AEROSOL, METERED RESPIRATORY (INHALATION)
Qty: 18 G | Refills: 0 | Status: SHIPPED | OUTPATIENT
Start: 2020-12-17 | End: 2021-01-03 | Stop reason: SDUPTHER

## 2020-12-21 ENCOUNTER — OFFICE VISIT (OUTPATIENT)
Dept: FAMILY MEDICINE CLINIC | Age: 25
End: 2020-12-21
Payer: COMMERCIAL

## 2020-12-21 ENCOUNTER — HOSPITAL ENCOUNTER (OUTPATIENT)
Dept: LAB | Age: 25
Discharge: HOME | End: 2020-12-21
Payer: COMMERCIAL

## 2020-12-21 VITALS
TEMPERATURE: 96.5 F | SYSTOLIC BLOOD PRESSURE: 119 MMHG | WEIGHT: 222 LBS | RESPIRATION RATE: 17 BRPM | HEIGHT: 66 IN | DIASTOLIC BLOOD PRESSURE: 82 MMHG | HEART RATE: 94 BPM | BODY MASS INDEX: 35.68 KG/M2 | OXYGEN SATURATION: 95 %

## 2020-12-21 DIAGNOSIS — N92.6 LATE MENSES: ICD-10-CM

## 2020-12-21 DIAGNOSIS — L90.5 SCAR OF BREAST: ICD-10-CM

## 2020-12-21 DIAGNOSIS — N92.6 LATE MENSES: Primary | ICD-10-CM

## 2020-12-21 PROBLEM — J30.2 SEASONAL ALLERGIC RHINITIS: Status: ACTIVE | Noted: 2019-05-01

## 2020-12-21 PROBLEM — J45.40 MODERATE PERSISTENT ASTHMA WITHOUT COMPLICATION: Status: ACTIVE | Noted: 2019-09-23

## 2020-12-21 LAB — HCG SERPL-ACNC: <1 MIU/ML (ref 0–10)

## 2020-12-21 PROCEDURE — 36415 COLL VENOUS BLD VENIPUNCTURE: CPT

## 2020-12-21 PROCEDURE — 84702 CHORIONIC GONADOTROPIN TEST: CPT

## 2020-12-21 PROCEDURE — 99213 OFFICE O/P EST LOW 20 MIN: CPT | Performed by: PHYSICIAN ASSISTANT

## 2020-12-21 PROCEDURE — 81025 URINE PREGNANCY TEST: CPT | Performed by: PHYSICIAN ASSISTANT

## 2020-12-21 NOTE — PATIENT INSTRUCTIONS
Delayed Menstrual Periods: Care Instructions Your Care Instructions Some young women start their menstrual periods later than others. They may have pubic hair and breasts but still not have periods. When a woman does not get her period every month as expected or does not get her first period by the time she is 13, it is called amenorrhea. Amenorrhea can happen for many reasons. Sometimes it's caused by a problem with the reproductive organs or another medical problem. Other times it's caused by doing hard exercise for long periods of time. It can also happen if you don't eat well or if you diet too much or have an eating disorder. Pregnancy and certain types of birth control can also delay your periods. Your doctor will want to find out why your period hasn't started. You may need to make some diet and exercise changes. These may help start your period. Or you may need treatment for another problem. Follow-up care is a key part of your treatment and safety. Be sure to make and go to all appointments, and call your doctor if you are having problems. It's also a good idea to know your test results and keep a list of the medicines you take. How can you care for yourself at home? · Eat a healthy, balanced diet. Include fruits, vegetables, whole grains, proteins, and low-fat dairy products. · Stay at a healthy weight. Ask your doctor what a healthy weight is for you. · Get regular exercise. But don't do hard, long exercise. Ask your doctor how much is too much. · If you are embarrassed about not having your period yet, talk to family members about how you are feeling. You can also talk to your doctor or a counselor. When should you call for help? Watch closely for changes in your health, and be sure to contact your doctor if: 
  · You do not get your period as expected.  
  · You think you might be pregnant. Where can you learn more? Go to http://www.[employer].com/ Enter [phone] in the search box to learn more about \"Delayed Menstrual Periods: Care Instructions. \" Current as of: November 8, 2019               Content Version: 12.6 © 5380-0111 [employer]. Care instructions adapted under license by [employer] (which disclaims liability or warranty for this information). If you have questions about a medical condition or this instruction, always ask your healthcare professional. [address] [address] any warranty or liability for your use of this information.

## 2020-12-21 NOTE — PROGRESS NOTES
HISTORY OF PRESENT ILLNESS  Remedios Lopez is a 31 y.o. female. HPI   Pt is being seen for follow up on a sore on her right breast. She was seen a month ago and states it has not healed. She denies tenderness, drainage, redness, warmth, itching, and worsening. She states it is flat and dark in color. She also is a week late on her cycle. She took a home pregnancy test yesterday and it was neg. Denies abd pain, breast tenderness, N/V, and weigh changes. Allergies   Allergen Reactions    Amoxicillin Rash       Current Outpatient Medications   Medication Sig    albuterol (PROVENTIL HFA, VENTOLIN HFA, PROAIR HFA) 90 mcg/actuation inhaler INHALE 2 PUFFS BY MOUTH EVERY 4 HOURS AS NEEDED FOR WHEEZING    fluticasone propion-salmeteroL (ADVAIR/WIXELA) 500-50 mcg/dose diskus inhaler Take 1 Puff by inhalation two (2) times a day.  norgestimate-ethinyl estradioL (Sprintec, 28,) 0.25-35 mg-mcg tab Take 1 tab daily and skip placebo    montelukast (SINGULAIR) 10 mg tablet Take 1 Tab by mouth daily. No current facility-administered medications for this visit. Review of Systems   Constitutional: Negative. Negative for chills, fever and malaise/fatigue. HENT: Negative. Negative for congestion, ear pain, sore throat and tinnitus. Eyes: Negative. Negative for blurred vision, double vision and photophobia. Respiratory: Negative. Negative for cough, shortness of breath and wheezing. Cardiovascular: Negative. Negative for chest pain, palpitations and leg swelling. Gastrointestinal: Negative. Negative for abdominal pain, heartburn, nausea and vomiting. Genitourinary: Negative for dysuria, frequency, hematuria and urgency. 1 week late on menstrual cycle   Musculoskeletal: Negative. Negative for back pain, joint pain, myalgias and neck pain. Skin: Negative for itching and rash. Sore on right breast   Neurological: Negative.   Negative for dizziness, tingling, tremors and headaches. Psychiatric/Behavioral: Negative. Negative for depression and memory loss. The patient is not nervous/anxious and does not have insomnia. Visit Vitals  /82   Pulse 94   Temp (!) 96.5 °F (35.8 °C) (Temporal)   Resp 17   Ht 5' 6\" (1.676 m)   Wt 222 lb (100.7 kg)   SpO2 95%   BMI 35.83 kg/m²       Physical Exam  Constitutional:       General: She is not in acute distress. Appearance: Normal appearance. She is obese. She is not ill-appearing. HENT:      Head: Normocephalic and atraumatic. Cardiovascular:      Rate and Rhythm: Normal rate and regular rhythm. Pulses: Normal pulses. Heart sounds: Normal heart sounds. Pulmonary:      Effort: Pulmonary effort is normal. No respiratory distress. Breath sounds: Normal breath sounds. Abdominal:      Palpations: Abdomen is soft. Tenderness: There is no abdominal tenderness. Skin:         Neurological:      Mental Status: She is alert and oriented to person, place, and time. Psychiatric:         Mood and Affect: Mood normal.         Behavior: Behavior normal.         Thought Content: Thought content normal.         Judgment: Judgment normal.         ASSESSMENT and PLAN    ICD-10-CM ICD-9-CM    1. Late menses  N92.6 626.8 AMB POC URINE PREGNANCY TEST, VISUAL COLOR COMPARISON      BETA HCG, QT   2. Scar of breast  L90.5 709.2      Follow-up and Dispositions    · Return if symptoms worsen or fail to improve. Pt expressed understanding of visit summary and plans for any follow ups or referrals as well as any medications prescribed.

## 2021-01-02 DIAGNOSIS — J45.909 UNCOMPLICATED ASTHMA, UNSPECIFIED ASTHMA SEVERITY, UNSPECIFIED WHETHER PERSISTENT: ICD-10-CM

## 2021-01-03 RX ORDER — ALBUTEROL SULFATE 90 UG/1
AEROSOL, METERED RESPIRATORY (INHALATION)
Qty: 1 INHALER | Refills: 0 | Status: SHIPPED | OUTPATIENT
Start: 2021-01-03 | End: 2021-01-08

## 2021-01-05 DIAGNOSIS — J45.909 UNCOMPLICATED ASTHMA, UNSPECIFIED ASTHMA SEVERITY, UNSPECIFIED WHETHER PERSISTENT: ICD-10-CM

## 2021-01-08 RX ORDER — ALBUTEROL SULFATE 90 UG/1
AEROSOL, METERED RESPIRATORY (INHALATION)
Qty: 1 INHALER | Refills: 0 | Status: SHIPPED | OUTPATIENT
Start: 2021-01-08 | End: 2021-02-12

## 2021-01-22 ENCOUNTER — TELEPHONE (OUTPATIENT)
Dept: FAMILY MEDICINE CLINIC | Age: 26
End: 2021-01-22

## 2021-01-22 DIAGNOSIS — J45.909 MODERATE ASTHMA WITHOUT COMPLICATION, UNSPECIFIED WHETHER PERSISTENT: Primary | ICD-10-CM

## 2021-01-22 RX ORDER — ALBUTEROL SULFATE 0.83 MG/ML
2.5 SOLUTION RESPIRATORY (INHALATION)
Qty: 30 NEBULE | Refills: 1 | Status: SHIPPED | OUTPATIENT
Start: 2021-01-22

## 2021-01-23 NOTE — TELEPHONE ENCOUNTER
----- Message from Angelene Brunner, LPN sent at 6/93/5535  9:08 AM EST -----  Regarding: FW: Prescription Question  Contact: [phone]    ----- Message -----  From: Franciso Severe  Sent: 1/20/2021   9:06 AM EST  To: [facility]  Subject: Prescription Question                            Hi Ms Fernandez. I used to have a prescription for albuterol sulfate inhalation solution  . 083% 3 ml for my nebulizer and I don't see it anymore. I'm running low and would like to get some more if you could make a prescription and send it to my pharmacy.  Thank you so much

## 2021-01-25 LAB
HCG URINE, QL. (POC): NEGATIVE
VALID INTERNAL CONTROL?: YES

## 2021-02-10 DIAGNOSIS — J45.909 UNCOMPLICATED ASTHMA, UNSPECIFIED ASTHMA SEVERITY, UNSPECIFIED WHETHER PERSISTENT: ICD-10-CM

## 2021-02-12 RX ORDER — ALBUTEROL SULFATE 90 UG/1
AEROSOL, METERED RESPIRATORY (INHALATION)
Qty: 9 G | Refills: 0 | Status: SHIPPED | OUTPATIENT
Start: 2021-02-12 | End: 2021-03-01 | Stop reason: SDUPTHER

## 2021-03-01 DIAGNOSIS — J45.909 UNCOMPLICATED ASTHMA, UNSPECIFIED ASTHMA SEVERITY, UNSPECIFIED WHETHER PERSISTENT: ICD-10-CM

## 2021-03-07 RX ORDER — ALBUTEROL SULFATE 90 UG/1
2 AEROSOL, METERED RESPIRATORY (INHALATION)
Qty: 9 G | Refills: 2 | Status: SHIPPED | OUTPATIENT
Start: 2021-03-07 | End: 2021-05-03

## 2021-04-21 DIAGNOSIS — N94.6 DYSMENORRHEA: ICD-10-CM

## 2021-04-26 RX ORDER — NORGESTIMATE AND ETHINYL ESTRADIOL 0.25-0.035
KIT ORAL
Qty: 4 PACKAGE | Refills: 3 | Status: SHIPPED | OUTPATIENT
Start: 2021-04-26

## 2021-05-01 DIAGNOSIS — J45.909 UNCOMPLICATED ASTHMA, UNSPECIFIED ASTHMA SEVERITY, UNSPECIFIED WHETHER PERSISTENT: ICD-10-CM

## 2021-05-03 RX ORDER — ALBUTEROL SULFATE 90 UG/1
AEROSOL, METERED RESPIRATORY (INHALATION)
Qty: 9 G | Refills: 0 | Status: SHIPPED | OUTPATIENT
Start: 2021-05-03 | End: 2021-05-14

## 2021-05-14 DIAGNOSIS — J45.909 UNCOMPLICATED ASTHMA, UNSPECIFIED ASTHMA SEVERITY, UNSPECIFIED WHETHER PERSISTENT: ICD-10-CM

## 2021-05-14 RX ORDER — ALBUTEROL SULFATE 90 UG/1
AEROSOL, METERED RESPIRATORY (INHALATION)
Qty: 9 G | Refills: 0 | Status: SHIPPED | OUTPATIENT
Start: 2021-05-14 | End: 2021-06-21 | Stop reason: SDUPTHER

## 2021-06-14 ENCOUNTER — HOSPITAL ENCOUNTER (OUTPATIENT)
Dept: LAB | Age: 26
Discharge: HOME | End: 2021-06-14
Payer: COMMERCIAL

## 2021-06-14 ENCOUNTER — OFFICE VISIT (OUTPATIENT)
Dept: FAMILY MEDICINE CLINIC | Age: 26
End: 2021-06-14
Payer: COMMERCIAL

## 2021-06-14 VITALS
TEMPERATURE: 97.4 F | WEIGHT: 216 LBS | BODY MASS INDEX: 34.72 KG/M2 | DIASTOLIC BLOOD PRESSURE: 75 MMHG | SYSTOLIC BLOOD PRESSURE: 113 MMHG | OXYGEN SATURATION: 95 % | HEIGHT: 66 IN | HEART RATE: 97 BPM | RESPIRATION RATE: 17 BRPM

## 2021-06-14 DIAGNOSIS — Z01.84 IMMUNITY STATUS TESTING: ICD-10-CM

## 2021-06-14 DIAGNOSIS — Z11.1 SCREENING-PULMONARY TB: ICD-10-CM

## 2021-06-14 DIAGNOSIS — Z00.00 ROUTINE GENERAL MEDICAL EXAMINATION AT A HEALTH CARE FACILITY: Primary | ICD-10-CM

## 2021-06-14 DIAGNOSIS — Z23 ENCOUNTER FOR IMMUNIZATION: ICD-10-CM

## 2021-06-14 PROCEDURE — 86787 VARICELLA-ZOSTER ANTIBODY: CPT

## 2021-06-14 PROCEDURE — 86765 RUBEOLA ANTIBODY: CPT

## 2021-06-14 PROCEDURE — 86580 TB INTRADERMAL TEST: CPT | Performed by: PHYSICIAN ASSISTANT

## 2021-06-14 PROCEDURE — 86762 RUBELLA ANTIBODY: CPT

## 2021-06-14 PROCEDURE — 86735 MUMPS ANTIBODY: CPT

## 2021-06-14 PROCEDURE — 99395 PREV VISIT EST AGE 18-39: CPT | Performed by: PHYSICIAN ASSISTANT

## 2021-06-14 PROCEDURE — 36415 COLL VENOUS BLD VENIPUNCTURE: CPT

## 2021-06-14 NOTE — LETTER
NOTIFICATION RETURN TO WORK  
 
6/14/2021 10:40 AM 
 
Ms. Joanne Aguilar [address] [facility] [address] To Whom It May Concern: 
 
Joanne Aguilar is currently under the care of [address] [place] [address]. She had an appointment today and has a follow up appointment on 6/16/2021. If there are questions or concerns please have the patient contact our office.  
 
 
 
Sincerely, 
 
 
Willie Chu PA-C

## 2021-06-15 LAB
MEV IGG SER IA-ACNC: >300 AU/ML
MUV IGG SER IA-ACNC: >300 AU/ML
RUBV IGG SER-IMP: NORMAL
VZV IGG SER IA-ACNC: 875 INDEX

## 2021-06-16 DIAGNOSIS — J45.909 UNCOMPLICATED ASTHMA, UNSPECIFIED ASTHMA SEVERITY, UNSPECIFIED WHETHER PERSISTENT: ICD-10-CM

## 2021-06-16 LAB
MM INDURATION POC: 0 MM (ref 0–5)
PPD POC: NEGATIVE NEGATIVE

## 2021-06-21 RX ORDER — ALBUTEROL SULFATE 90 UG/1
AEROSOL, METERED RESPIRATORY (INHALATION)
Qty: 9 G | Refills: 0 | Status: SHIPPED | OUTPATIENT
Start: 2021-06-21 | End: 2021-07-13

## 2021-06-25 ENCOUNTER — CLINICAL SUPPORT (OUTPATIENT)
Dept: FAMILY MEDICINE CLINIC | Age: 26
End: 2021-06-25
Payer: COMMERCIAL

## 2021-06-25 DIAGNOSIS — Z11.1 SCREENING-PULMONARY TB: Primary | ICD-10-CM

## 2021-06-25 DIAGNOSIS — Z23 NEED FOR TDAP VACCINATION: ICD-10-CM

## 2021-06-25 PROCEDURE — 86580 TB INTRADERMAL TEST: CPT | Performed by: PHYSICIAN ASSISTANT

## 2021-06-28 PROCEDURE — 90715 TDAP VACCINE 7 YRS/> IM: CPT | Performed by: PHYSICIAN ASSISTANT

## 2021-07-09 NOTE — PROGRESS NOTES
HISTORY OF PRESENT ILLNESS  Remedios Ordoñez is a 31 y.o. female. HPI   Pt is being seen for CPe for school and has forms to be completed including immunization/titer forms. Past Medical History:   Diagnosis Date    Asthma      History reviewed. No pertinent surgical history. Social History     Socioeconomic History    Marital status: SINGLE     Spouse name: Not on file    Number of children: Not on file    Years of education: Not on file    Highest education level: Not on file   Occupational History    Not on file   Tobacco Use    Smoking status: Former Smoker     Years: 3.00    Smokeless tobacco: Never Used   Substance and Sexual Activity    Alcohol use: Yes     Comment: socially    Drug use: Not Currently    Sexual activity: Yes     Partners: Male     Birth control/protection: Pill   Other Topics Concern    Not on file   Social History Narrative    Not on file     Social Determinants of Health     Financial Resource Strain:     Difficulty of Paying Living Expenses:    Food Insecurity:     Worried About Running Out of Food in the Last Year:     [address] in the Last Year:    Transportation Needs:     Lack of Transportation (Medical):      Lack of Transportation (Non-Medical):    Physical Activity:     Days of Exercise per Week:     Minutes of Exercise per Session:    Stress:     Feeling of Stress :    Social Connections:     Frequency of Communication with Friends and Family:     Frequency of Social Gatherings with Friends and Family:     Attends [employer]ist Services:     Active Member of Clubs or Organizations:     Attends Club or Organization Meetings:     Marital Status:    Intimate Partner Violence:     Fear of Current or Ex-Partner:     Emotionally Abused:     Physically Abused:     Sexually Abused:      Family History   Problem Relation Age of Onset    Asthma Mother     Cancer Mother     Atrial Fibrillation Father     No Known Problems Sister     No Known Problems Brother          Allergies   Allergen Reactions    Amoxicillin Rash       Current Outpatient Medications   Medication Sig    norgestimate-ethinyl estradioL (Sprintec, 28,) 0.25-35 mg-mcg tab Take 1 tab daily and skip placebo    albuterol (PROVENTIL VENTOLIN) 2.5 mg /3 mL (0.083 %) nebu 3 mL by Nebulization route every four (4) hours as needed for Wheezing.  fluticasone propion-salmeteroL (ADVAIR/WIXELA) 500-50 mcg/dose diskus inhaler Take 1 Puff by inhalation two (2) times a day.  albuterol (PROVENTIL HFA, VENTOLIN HFA, PROAIR HFA) 90 mcg/actuation inhaler INHALE 2 PUFFS BY MOUTH EVERY 4 HOURS AS NEEDED FOR WHEEZING    montelukast (SINGULAIR) 10 mg tablet Take 1 Tab by mouth daily. (Patient not taking: Reported on 6/14/2021)     No current facility-administered medications for this visit. Review of Systems   Constitutional: Negative. Negative for chills, fever and malaise/fatigue. HENT: Negative. Negative for congestion, ear pain, sore throat and tinnitus. Eyes: Negative. Negative for blurred vision, double vision and photophobia. Respiratory: Negative. Negative for cough, shortness of breath and wheezing. Cardiovascular: Negative. Negative for chest pain, palpitations and leg swelling. Gastrointestinal: Negative. Negative for abdominal pain, heartburn, nausea and vomiting. Genitourinary: Negative for dysuria, frequency, hematuria and urgency. Musculoskeletal: Negative. Negative for back pain, joint pain, myalgias and neck pain. Skin: Negative. Negative for itching and rash. Neurological: Negative. Negative for dizziness, tingling, tremors and headaches. Psychiatric/Behavioral: Negative. Negative for depression and memory loss. The patient is not nervous/anxious and does not have insomnia.       Visit Vitals  /75 (BP 1 Location: Left upper arm, BP Patient Position: Sitting, BP Cuff Size: Large adult)   Pulse 97   Temp 97.4 °F (36.3 °C) (Temporal)   Resp 17   Ht 5' 6\" (1.676 m)   Wt 216 lb (98 kg)   SpO2 95%   BMI 34.86 kg/m²         Physical Exam  Vitals reviewed. Constitutional:       General: She is not in acute distress. Appearance: Normal appearance. She is obese. She is not ill-appearing. HENT:      Head: Normocephalic and atraumatic. Cardiovascular:      Rate and Rhythm: Normal rate and regular rhythm. Pulses: Normal pulses. Heart sounds: Normal heart sounds. Pulmonary:      Effort: Pulmonary effort is normal. No respiratory distress. Breath sounds: Normal breath sounds. Abdominal:      Palpations: Abdomen is soft. Tenderness: There is no abdominal tenderness. Neurological:      Mental Status: She is alert and oriented to person, place, and time. Psychiatric:         Mood and Affect: Mood normal.         Behavior: Behavior normal.         Thought Content: Thought content normal.         Judgment: Judgment normal.         ASSESSMENT and PLAN    ICD-10-CM ICD-9-CM    1. Routine general medical examination at a health care facility  Z00.00 V70.0    2. Screening-pulmonary TB  Z11.1 V74.1 AMB POC TUBERCULOSIS, INTRADERMAL (SKIN TEST)   3. Immunity status testing  Z01.84 V72.61 RUBEOLA AB, IGG      MUMPS AB, IGG      RUBELLA AB, IGG      VZV AB, IGG   4. Encounter for immunization  Z23 V03.89      Follow-up and Dispositions    · Return in about 2 weeks (around 6/28/2021) for follow up ppd. Pt expressed understanding of visit summary and plans for any follow ups or referrals as well as any medications prescribed.

## 2021-07-11 DIAGNOSIS — J45.909 UNCOMPLICATED ASTHMA, UNSPECIFIED ASTHMA SEVERITY, UNSPECIFIED WHETHER PERSISTENT: ICD-10-CM

## 2021-07-13 RX ORDER — ALBUTEROL SULFATE 90 UG/1
AEROSOL, METERED RESPIRATORY (INHALATION)
Qty: 9 G | Refills: 0 | Status: SHIPPED | OUTPATIENT
Start: 2021-07-13 | End: 2021-08-06

## 2021-08-04 DIAGNOSIS — J45.909 UNCOMPLICATED ASTHMA, UNSPECIFIED ASTHMA SEVERITY, UNSPECIFIED WHETHER PERSISTENT: ICD-10-CM

## 2021-08-06 RX ORDER — ALBUTEROL SULFATE 90 UG/1
AEROSOL, METERED RESPIRATORY (INHALATION)
Qty: 9 G | Refills: 0 | Status: SHIPPED | OUTPATIENT
Start: 2021-08-06 | End: 2021-09-02

## 2021-08-30 DIAGNOSIS — J45.909 UNCOMPLICATED ASTHMA, UNSPECIFIED ASTHMA SEVERITY, UNSPECIFIED WHETHER PERSISTENT: ICD-10-CM

## 2021-09-02 RX ORDER — ALBUTEROL SULFATE 90 UG/1
AEROSOL, METERED RESPIRATORY (INHALATION)
Qty: 9 G | Refills: 0 | Status: SHIPPED | OUTPATIENT
Start: 2021-09-02 | End: 2021-10-15

## 2021-10-12 DIAGNOSIS — J45.909 UNCOMPLICATED ASTHMA, UNSPECIFIED ASTHMA SEVERITY, UNSPECIFIED WHETHER PERSISTENT: ICD-10-CM

## 2021-10-14 DIAGNOSIS — J45.909 MODERATE ASTHMA WITHOUT COMPLICATION, UNSPECIFIED WHETHER PERSISTENT: ICD-10-CM

## 2021-10-14 DIAGNOSIS — J45.909 UNCOMPLICATED ASTHMA, UNSPECIFIED ASTHMA SEVERITY, UNSPECIFIED WHETHER PERSISTENT: ICD-10-CM

## 2021-10-15 RX ORDER — ALBUTEROL SULFATE 90 UG/1
AEROSOL, METERED RESPIRATORY (INHALATION)
Qty: 9 G | Refills: 0 | Status: SHIPPED | OUTPATIENT
Start: 2021-10-15

## 2021-10-19 RX ORDER — ALBUTEROL SULFATE 90 UG/1
2 AEROSOL, METERED RESPIRATORY (INHALATION)
Qty: 9 G | Refills: 0 | Status: SHIPPED | OUTPATIENT
Start: 2021-10-19 | End: 2022-01-27 | Stop reason: SDUPTHER

## 2021-10-19 RX ORDER — FLUTICASONE PROPIONATE AND SALMETEROL 500; 50 UG/1; UG/1
1 POWDER RESPIRATORY (INHALATION) 2 TIMES DAILY
Qty: 3 EACH | Refills: 1 | Status: SHIPPED | OUTPATIENT
Start: 2021-10-19 | End: 2022-08-17 | Stop reason: SDUPTHER

## 2021-11-15 DIAGNOSIS — J45.909 UNCOMPLICATED ASTHMA, UNSPECIFIED ASTHMA SEVERITY, UNSPECIFIED WHETHER PERSISTENT: ICD-10-CM

## 2021-11-16 RX ORDER — MONTELUKAST SODIUM 10 MG/1
10 TABLET ORAL DAILY
Qty: 30 TABLET | Refills: 11 | Status: SHIPPED | OUTPATIENT
Start: 2021-11-16 | End: 2022-08-17 | Stop reason: SDUPTHER

## 2021-11-29 ENCOUNTER — HOSPITAL ENCOUNTER (OUTPATIENT)
Dept: LAB | Age: 26
Discharge: HOME | End: 2021-11-29
Payer: COMMERCIAL

## 2021-11-29 ENCOUNTER — OFFICE VISIT (OUTPATIENT)
Dept: FAMILY MEDICINE CLINIC | Age: 26
End: 2021-11-29
Payer: COMMERCIAL

## 2021-11-29 VITALS
TEMPERATURE: 97.3 F | RESPIRATION RATE: 16 BRPM | OXYGEN SATURATION: 97 % | HEIGHT: 66 IN | BODY MASS INDEX: 35.52 KG/M2 | WEIGHT: 221 LBS | SYSTOLIC BLOOD PRESSURE: 137 MMHG | HEART RATE: 96 BPM | DIASTOLIC BLOOD PRESSURE: 81 MMHG

## 2021-11-29 DIAGNOSIS — D50.0 IRON DEFICIENCY ANEMIA DUE TO CHRONIC BLOOD LOSS: ICD-10-CM

## 2021-11-29 DIAGNOSIS — J45.909 MODERATE ASTHMA WITHOUT COMPLICATION, UNSPECIFIED WHETHER PERSISTENT: ICD-10-CM

## 2021-11-29 DIAGNOSIS — R00.2 PALPITATIONS: ICD-10-CM

## 2021-11-29 DIAGNOSIS — R79.89 POSITIVE D DIMER: Primary | ICD-10-CM

## 2021-11-29 LAB
BASOPHILS # BLD: 0.1 K/UL (ref 0–0.1)
BASOPHILS NFR BLD: 1 % (ref 0–2)
D DIMER PPP FEU-MCNC: 0.98 UG/ML(FEU)
DIFFERENTIAL METHOD BLD: ABNORMAL
EOSINOPHIL # BLD: 0.6 K/UL (ref 0–0.4)
EOSINOPHIL NFR BLD: 5 % (ref 0–5)
ERYTHROCYTE [DISTWIDTH] IN BLOOD BY AUTOMATED COUNT: 15.9 % (ref 11.6–14.5)
FERRITIN SERPL-MCNC: 4 NG/ML (ref 8–388)
HCG SERPL-ACNC: <1 MIU/ML (ref 0–10)
HCT VFR BLD AUTO: 37 % (ref 35–45)
HGB BLD-MCNC: 11.1 G/DL (ref 12–16)
IMM GRANULOCYTES # BLD AUTO: 0 K/UL (ref 0–0.04)
IMM GRANULOCYTES NFR BLD AUTO: 0 % (ref 0–0.5)
IRON SATN MFR SERPL: 6 % (ref 20–50)
IRON SERPL-MCNC: 32 UG/DL (ref 50–175)
LYMPHOCYTES # BLD: 3 K/UL (ref 0.9–3.6)
LYMPHOCYTES NFR BLD: 27 % (ref 21–52)
MCH RBC QN AUTO: 24.1 PG (ref 24–34)
MCHC RBC AUTO-ENTMCNC: 30 G/DL (ref 31–37)
MCV RBC AUTO: 80.4 FL (ref 78–100)
MONOCYTES # BLD: 1 K/UL (ref 0.05–1.2)
MONOCYTES NFR BLD: 9 % (ref 3–10)
NEUTS SEG # BLD: 6.4 K/UL (ref 1.8–8)
NEUTS SEG NFR BLD: 58 % (ref 40–73)
NRBC # BLD: 0 K/UL (ref 0–0.01)
NRBC BLD-RTO: 0 PER 100 WBC
PLATELET # BLD AUTO: 548 K/UL (ref 135–420)
PMV BLD AUTO: 9.5 FL (ref 9.2–11.8)
RBC # BLD AUTO: 4.6 M/UL (ref 4.2–5.3)
TIBC SERPL-MCNC: 551 UG/DL (ref 250–450)
WBC # BLD AUTO: 11 K/UL (ref 4.6–13.2)

## 2021-11-29 PROCEDURE — 83540 ASSAY OF IRON: CPT

## 2021-11-29 PROCEDURE — 36415 COLL VENOUS BLD VENIPUNCTURE: CPT

## 2021-11-29 PROCEDURE — 84702 CHORIONIC GONADOTROPIN TEST: CPT

## 2021-11-29 PROCEDURE — 85025 COMPLETE CBC W/AUTO DIFF WBC: CPT

## 2021-11-29 PROCEDURE — 82728 ASSAY OF FERRITIN: CPT

## 2021-11-29 PROCEDURE — 99214 OFFICE O/P EST MOD 30 MIN: CPT | Performed by: PHYSICIAN ASSISTANT

## 2021-11-29 PROCEDURE — 85379 FIBRIN DEGRADATION QUANT: CPT

## 2021-11-29 NOTE — PROGRESS NOTES
Yang Navarro is a 31 y.o.  female presents today for office visit for follow up . Pt would also like to discuss palpitations. Pt is not fasting. Pt is in Room # 5      1. Have you been to the ER, urgent care clinic since your last visit? Hospitalized since your last visit? yes    2. Have you seen or consulted any other health care providers outside of the [address] [place] [address] since your last visit? Include any pap smears or colon screening. No    Upcoming Appts  none    Health Maintenance reviewed      VORB: No orders of the defined types were placed in this encounter.   JACE Gunter-Shwetha Harrison LPN

## 2022-01-03 NOTE — PATIENT INSTRUCTIONS

## 2022-01-03 NOTE — PROGRESS NOTES
HISTORY OF PRESENT ILLNESS  Remedios Pino is a 31 y.o. female. HPI   Pt is being seen for f/u from the ER for CP and palpitations. D dimer was positive but pt had normal scan, EKG, and cardiac enzymes. Her CP has improved. Will repeat labs and check for anemia as she has a hx of it. Will also refer her to cardiology. Allergies   Allergen Reactions    Amoxicillin Rash       Current Outpatient Medications   Medication Sig    ipratropium (ATROVENT HFA) 17 mcg/actuation inhaler Take 1 Puff by inhalation every four (4) hours as needed for Wheezing.  montelukast (Singulair) 10 mg tablet Take 1 Tablet by mouth daily.  fluticasone propion-salmeteroL (ADVAIR/WIXELA) 500-50 mcg/dose diskus inhaler Take 1 Puff by inhalation two (2) times a day.  albuterol (PROVENTIL HFA, VENTOLIN HFA, PROAIR HFA) 90 mcg/actuation inhaler INHALE 2 PUFFS BY MOUTH EVERY 4 HOURS AS NEEDED FOR WHEEZING    norgestimate-ethinyl estradioL (Sprintec, 28,) 0.25-35 mg-mcg tab Take 1 tab daily and skip placebo    albuterol (PROVENTIL VENTOLIN) 2.5 mg /3 mL (0.083 %) nebu 3 mL by Nebulization route every four (4) hours as needed for Wheezing.  albuterol (PROVENTIL HFA, VENTOLIN HFA, PROAIR HFA) 90 mcg/actuation inhaler Take 2 Puffs by inhalation every four (4) hours as needed for Wheezing. (Patient not taking: Reported on 11/29/2021)     No current facility-administered medications for this visit. Review of Systems   Constitutional: Negative. Negative for chills, fever and malaise/fatigue. HENT: Negative. Negative for congestion, ear pain, sore throat and tinnitus. Eyes: Negative. Negative for blurred vision, double vision and photophobia. Respiratory: Negative. Negative for cough, shortness of breath and wheezing. Cardiovascular: Positive for chest pain (improved) and palpitations (improved). Negative for leg swelling. Gastrointestinal: Negative. Negative for abdominal pain, heartburn, nausea and vomiting. Genitourinary: Negative for dysuria, frequency, hematuria and urgency. Musculoskeletal: Negative. Negative for back pain, joint pain, myalgias and neck pain. Skin: Negative. Negative for itching and rash. Neurological: Negative. Negative for dizziness, tingling, tremors and headaches. Psychiatric/Behavioral: Negative. Negative for depression and memory loss. The patient is not nervous/anxious and does not have insomnia. Visit Vitals  /81 (BP 1 Location: Left upper arm, BP Patient Position: Sitting)   Pulse 96   Temp 97.3 °F (36.3 °C) (Temporal)   Resp 16   Ht 5' 6\" (1.676 m)   Wt 221 lb (100.2 kg)   SpO2 97%   BMI 35.67 kg/m²       Physical Exam  Vitals reviewed. Constitutional:       General: She is not in acute distress. Appearance: Normal appearance. She is obese. She is not ill-appearing. HENT:      Head: Normocephalic and atraumatic. Cardiovascular:      Rate and Rhythm: Normal rate and regular rhythm. Pulses: Normal pulses. Heart sounds: Normal heart sounds. Pulmonary:      Effort: Pulmonary effort is normal. No respiratory distress. Breath sounds: Normal breath sounds. Abdominal:      Palpations: Abdomen is soft. Tenderness: There is no abdominal tenderness. Neurological:      Mental Status: She is alert and oriented to person, place, and time. Psychiatric:         Mood and Affect: Mood normal.         Behavior: Behavior normal.         Thought Content: Thought content normal.         Judgment: Judgment normal.         ASSESSMENT and PLAN    ICD-10-CM ICD-9-CM    1. Positive D dimer  R79.89 790.92 BETA HCG, QT      D DIMER      REFERRAL TO CARDIOLOGY   2. Moderate asthma without complication, unspecified whether persistent  J45.909 493.90 ipratropium (ATROVENT HFA) 17 mcg/actuation inhaler   3.  Palpitations  R00.2 785.1 ipratropium (ATROVENT HFA) 17 mcg/actuation inhaler      IRON PROFILE      D DIMER      CBC WITH AUTOMATED DIFF      REFERRAL TO CARDIOLOGY      FERRITIN   4. Iron deficiency anemia due to chronic blood loss  D50.0 280.0 FERRITIN     Follow-up and Dispositions    · Return if symptoms worsen or fail to improve. Pt expressed understanding of visit summary and plans for any follow ups or referrals as well as any medications prescribed.

## 2022-01-27 DIAGNOSIS — J45.909 UNCOMPLICATED ASTHMA, UNSPECIFIED ASTHMA SEVERITY, UNSPECIFIED WHETHER PERSISTENT: ICD-10-CM

## 2022-01-29 RX ORDER — ALBUTEROL SULFATE 90 UG/1
2 AEROSOL, METERED RESPIRATORY (INHALATION)
Qty: 9 G | Refills: 0 | Status: SHIPPED | OUTPATIENT
Start: 2022-01-29 | End: 2022-04-25

## 2022-03-18 PROBLEM — J45.40 MODERATE PERSISTENT ASTHMA WITHOUT COMPLICATION: Status: ACTIVE | Noted: 2019-09-23

## 2022-03-19 PROBLEM — J30.2 SEASONAL ALLERGIC RHINITIS: Status: ACTIVE | Noted: 2019-05-01

## 2022-04-20 DIAGNOSIS — J45.909 UNCOMPLICATED ASTHMA, UNSPECIFIED ASTHMA SEVERITY, UNSPECIFIED WHETHER PERSISTENT: ICD-10-CM

## 2022-04-25 RX ORDER — ALBUTEROL SULFATE 90 UG/1
AEROSOL, METERED RESPIRATORY (INHALATION)
Qty: 9 G | Refills: 0 | Status: SHIPPED | OUTPATIENT
Start: 2022-04-25 | End: 2022-08-17 | Stop reason: SDUPTHER

## 2022-08-17 DIAGNOSIS — J45.909 UNCOMPLICATED ASTHMA, UNSPECIFIED ASTHMA SEVERITY, UNSPECIFIED WHETHER PERSISTENT: ICD-10-CM

## 2022-08-17 DIAGNOSIS — J45.909 MODERATE ASTHMA WITHOUT COMPLICATION, UNSPECIFIED WHETHER PERSISTENT: ICD-10-CM

## 2022-08-18 RX ORDER — MONTELUKAST SODIUM 10 MG/1
10 TABLET ORAL DAILY
Qty: 30 TABLET | Refills: 11 | Status: SHIPPED | OUTPATIENT
Start: 2022-08-18

## 2022-08-18 RX ORDER — ALBUTEROL SULFATE 90 UG/1
2 AEROSOL, METERED RESPIRATORY (INHALATION)
Qty: 9 G | Refills: 0 | Status: SHIPPED | OUTPATIENT
Start: 2022-08-18

## 2022-08-18 RX ORDER — FLUTICASONE PROPIONATE AND SALMETEROL 500; 50 UG/1; UG/1
1 POWDER RESPIRATORY (INHALATION) 2 TIMES DAILY
Qty: 3 EACH | Refills: 1 | Status: SHIPPED | OUTPATIENT
Start: 2022-08-18 | End: 2022-09-22 | Stop reason: SDUPTHER

## 2022-09-22 DIAGNOSIS — J45.909 MODERATE ASTHMA WITHOUT COMPLICATION, UNSPECIFIED WHETHER PERSISTENT: ICD-10-CM

## 2022-09-24 DIAGNOSIS — J45.909 MODERATE ASTHMA WITHOUT COMPLICATION, UNSPECIFIED WHETHER PERSISTENT: ICD-10-CM

## 2022-09-24 RX ORDER — FLUTICASONE PROPIONATE AND SALMETEROL 500; 50 UG/1; UG/1
1 POWDER RESPIRATORY (INHALATION) 2 TIMES DAILY
Qty: 3 EACH | Refills: 1 | Status: SHIPPED | OUTPATIENT
Start: 2022-09-24

## 2022-09-24 RX ORDER — FLUTICASONE PROPIONATE AND SALMETEROL 500; 50 UG/1; UG/1
1 POWDER RESPIRATORY (INHALATION) 2 TIMES DAILY
Qty: 3 EACH | Refills: 1 | Status: CANCELLED | OUTPATIENT
Start: 2022-09-24

## 2022-11-18 ENCOUNTER — OFFICE VISIT (OUTPATIENT)
Dept: FAMILY MEDICINE CLINIC | Age: 27
End: 2022-11-18
Payer: COMMERCIAL

## 2022-11-18 VITALS
DIASTOLIC BLOOD PRESSURE: 84 MMHG | BODY MASS INDEX: 34.91 KG/M2 | WEIGHT: 217.2 LBS | TEMPERATURE: 97.4 F | HEIGHT: 66 IN | HEART RATE: 82 BPM | RESPIRATION RATE: 17 BRPM | SYSTOLIC BLOOD PRESSURE: 120 MMHG | OXYGEN SATURATION: 98 %

## 2022-11-18 DIAGNOSIS — R00.2 PALPITATIONS: ICD-10-CM

## 2022-11-18 DIAGNOSIS — J45.909 UNCOMPLICATED ASTHMA, UNSPECIFIED ASTHMA SEVERITY, UNSPECIFIED WHETHER PERSISTENT: ICD-10-CM

## 2022-11-18 DIAGNOSIS — F41.9 ANXIETY: ICD-10-CM

## 2022-11-18 DIAGNOSIS — D50.0 IRON DEFICIENCY ANEMIA DUE TO CHRONIC BLOOD LOSS: ICD-10-CM

## 2022-11-18 DIAGNOSIS — D50.8 OTHER IRON DEFICIENCY ANEMIAS: ICD-10-CM

## 2022-11-18 DIAGNOSIS — R79.89 POSITIVE D DIMER: ICD-10-CM

## 2022-11-18 DIAGNOSIS — Z76.89 ENCOUNTER TO ESTABLISH CARE: Primary | ICD-10-CM

## 2022-11-18 DIAGNOSIS — J45.909 MODERATE ASTHMA WITHOUT COMPLICATION, UNSPECIFIED WHETHER PERSISTENT: ICD-10-CM

## 2022-11-18 PROCEDURE — 99204 OFFICE O/P NEW MOD 45 MIN: CPT

## 2022-11-18 RX ORDER — BUSPIRONE HYDROCHLORIDE 10 MG/1
10 TABLET ORAL 3 TIMES DAILY
Qty: 60 TABLET | Refills: 3 | Status: SHIPPED | OUTPATIENT
Start: 2022-11-18

## 2022-11-18 RX ORDER — MELATONIN 5 MG
5 TABLET, SUBLINGUAL SUBLINGUAL DAILY
Qty: 60 TABLET | Refills: 1 | Status: SHIPPED | OUTPATIENT
Start: 2022-11-18

## 2022-11-18 RX ORDER — ALBUTEROL SULFATE 90 UG/1
2 AEROSOL, METERED RESPIRATORY (INHALATION)
Qty: 9 G | Refills: 0 | Status: SHIPPED | OUTPATIENT
Start: 2022-11-18

## 2022-11-18 NOTE — PROGRESS NOTES
Micah Zavaleta (: 1995) is a 28 y.o. female, new patient, here for evaluation of the following chief complaint(s):  New Patient (Est. Care)         Subjective:     HPI:    Patient presents to [facility]. She had recent visit to the ED for cellulitis of leg. Infection has cleared after antibiotic treatment. She also reports episodes of palpitations. She reports that she has had syncopal episode with bleeding in 2021. History of elevated D-dimer 6 months ago and was unaware. .  CTA on 2021 was negative. She denies chest pain, dyspnea, recent fevers. She does report that sometimes she feels \" light headed and is is unable to take deep breaths when she feels stressed\"        Past Medical History:   Diagnosis Date    Asthma        Past Surgical History:   Procedure Laterality Date    HX DILATION AND CURETTAGE         ROS:    General: negative for - chills, fever, weight changes or malaise  HEENT: no sore throat, nasal congestion, vision problems or ear problems  Respiratory: no cough, shortness of breath, or wheezing  Cardiovascular: no chest pain, palpitations, or dyspnea on exertion  Gastrointestinal: no abdominal pain, N/V, change in bowel habits  Musculoskeletal: no back pain or joint pain  Neurological: no headache or dizziness  Endo:  No polyuria or polydipsia  : no urinary  Psychological: negative depression, sleeps issues, + anxiety       Objective:     Blood pressure 120/84, pulse 82, temperature 97.4 °F (36.3 °C), temperature source Temporal, resp. rate 17, height 5' 6\" (1.676 m), weight 217 lb 3.2 oz (98.5 kg), last menstrual period 10/21/2022, SpO2 98 %. Physical Exam:  Patient appears well nourished, alert, oriented x 3, in no distress. ENT normal.  Neck supple. No adenopathy or thyromegaly. . Lungs are clear to auscultation bilaterally. Breathing is unlabored and regular rhythm. No wheezes, no rhonchi.    Cardiovascular, S1 and S2 normal, no murmurs, regular rate and rhythm. Chest wall negative for tenderness  Abdomen is soft without tenderness, no guarding  /Anorectal, deferred. Muscleskeletal, no swelling, no tenderness, no injury. Extremities show no edema bilaterally. Neurological is normal without focal findings. Skin: no concerning lesions. Psych: normal affect. Mood good. Oriented x 3. Assessment & Plan:      Diagnoses and all orders for this visit:    1. Encounter to establish care  -     LIPID PANEL; Future  -     HEMOGLOBIN A1C WITH EAG; Future  -     TSH 3RD GENERATION; Future  -     METABOLIC PANEL, BASIC; Future  -     CBC WITH AUTOMATED DIFF; Future    2. Palpitations  -     D DIMER; Future  -     MICROALBUMIN, UR, RAND W/ MICROALB/CREAT RATIO; Future    3. Iron deficiency anemia due to chronic blood loss  -     IRON PROFILE; Future  -     URINALYSIS W/ RFLX MICROSCOPIC; Future  -     PERIPHERAL SMEAR; Future    4. Moderate asthma without complication, unspecified whether persistent    5. Uncomplicated asthma, unspecified asthma severity, unspecified whether persistent  -     albuterol (PROVENTIL HFA, VENTOLIN HFA, PROAIR HFA) 90 mcg/actuation inhaler; Take 2 Puffs by inhalation every four (4) hours as needed for Wheezing. 6. Anxiety  -     busPIRone (BUSPAR) 10 mg tablet; Take 1 Tablet by mouth three (3) times daily. Indications: repeated episodes of anxiety  -     melatonin 5 mg subl; 5 mg by SubLINGual route daily. -     albuterol (PROVENTIL HFA, VENTOLIN HFA, PROAIR HFA) 90 mcg/actuation inhaler; Take 2 Puffs by inhalation every four (4) hours as needed for Wheezing. 7. Other iron deficiency anemias    8. Positive D dimer  -     D DIMER;  Future  -     MICROALBUMIN, UR, RAND W/ MICROALB/CREAT RATIO; Future                On this date 11/18/2022 I have spent 30 minutes reviewing previous notes, test results and face to face with the patient discussing the diagnosis and importance of compliance with the treatment plan as well as documenting on the day of the visit. An electronic signature was used to authenticate this note.   -- EN Hathaway

## 2022-11-18 NOTE — PROGRESS NOTES
Nicolette Pina is a 28 y.o. presents today for   Chief Complaint   Patient presents with    New Patient     Est. Care     Is someone accompanying this pt? No    Is the patient using any DME equipment during OV? No    Visit Vitals  /84 (BP 1 Location: Left upper arm, BP Patient Position: Sitting, BP Cuff Size: Large adult)   Pulse 82   Temp 97.4 °F (36.3 °C) (Temporal)   Resp 17   Ht 5' 6\" (1.676 m)   Wt 217 lb 3.2 oz (98.5 kg)   SpO2 98%   BMI 35.06 kg/m²       Depression Screening:   3 most recent PHQ Screens 11/18/2022   Little interest or pleasure in doing things Several days   Feeling down, depressed, irritable, or hopeless Several days   Total Score PHQ 2 2       Health Maintenance: reviewed and discussed and ordered per Provider. Health Maintenance Due   Topic Date Due    Hepatitis C Screening  Never done    Pneumococcal 0-64 years (1 - PCV) Never done    Pap Smear  Never done    COVID-19 Vaccine (2 - Pfizer series) 09/16/2021    Flu Vaccine (1) Never done    Depression Screen  11/29/2022         Coordination of Care:   1. \"Have you been to the ER, urgent care clinic since your last visit? Hospitalized since your last visit? \" Yes When: 08/21//22 [facility] ED for abscess. 2. \"Have you seen or consulted any other health care providers outside of the [address] [place] [address] since your last visit? \" No     3. For patients aged 39-70: Has the patient had a colonoscopy / FIT/ Cologuard? NA - based on age    If the patient is female:    4. For patients aged 41-77: Has the patient had a mammogram within the past 2 years? NA - based on age or sex    11. For patients aged 21-65: Has the patient had a pap smear? No     Advanced Directive:  1. Do you have an Advanced Directive? No     2. Would you like information on Advanced Directives?  No    JOSE Cavanaugh

## 2023-08-02 ENCOUNTER — OFFICE VISIT (OUTPATIENT)
Facility: CLINIC | Age: 28
End: 2023-08-02
Payer: COMMERCIAL

## 2023-08-02 VITALS
HEIGHT: 66 IN | SYSTOLIC BLOOD PRESSURE: 130 MMHG | RESPIRATION RATE: 16 BRPM | DIASTOLIC BLOOD PRESSURE: 80 MMHG | OXYGEN SATURATION: 98 % | BODY MASS INDEX: 36 KG/M2 | TEMPERATURE: 97.2 F | WEIGHT: 224 LBS | HEART RATE: 80 BPM

## 2023-08-02 DIAGNOSIS — Z00.00 ANNUAL PHYSICAL EXAM: ICD-10-CM

## 2023-08-02 DIAGNOSIS — R40.0 DAYTIME SLEEPINESS: ICD-10-CM

## 2023-08-02 DIAGNOSIS — Z13.29 SCREENING FOR THYROID DISORDER: ICD-10-CM

## 2023-08-02 DIAGNOSIS — Z13.0 SCREENING FOR DEFICIENCY ANEMIA: ICD-10-CM

## 2023-08-02 DIAGNOSIS — R06.83 SNORES: ICD-10-CM

## 2023-08-02 DIAGNOSIS — Z13.1 SCREENING FOR DIABETES MELLITUS (DM): ICD-10-CM

## 2023-08-02 DIAGNOSIS — Z00.00 PREVENTATIVE HEALTH CARE: Primary | ICD-10-CM

## 2023-08-02 DIAGNOSIS — Z13.220 SCREENING FOR CHOLESTEROL LEVEL: ICD-10-CM

## 2023-08-02 DIAGNOSIS — Z83.49 FAMILY HISTORY OF THYROID DISEASE IN MOTHER: ICD-10-CM

## 2023-08-02 DIAGNOSIS — Z80.3 FAMILY HISTORY OF BREAST CANCER IN MOTHER: ICD-10-CM

## 2023-08-02 DIAGNOSIS — J45.40 MODERATE PERSISTENT ASTHMA WITHOUT COMPLICATION: ICD-10-CM

## 2023-08-02 PROCEDURE — 99395 PREV VISIT EST AGE 18-39: CPT | Performed by: NURSE PRACTITIONER

## 2023-08-02 RX ORDER — ALBUTEROL SULFATE 90 UG/1
2 AEROSOL, METERED RESPIRATORY (INHALATION) EVERY 4 HOURS PRN
Qty: 9 G | Refills: 5 | Status: SHIPPED | OUTPATIENT
Start: 2023-08-02

## 2023-08-02 RX ORDER — MONTELUKAST SODIUM 10 MG/1
10 TABLET ORAL DAILY
Qty: 90 TABLET | Refills: 3 | Status: SHIPPED | OUTPATIENT
Start: 2023-08-02

## 2023-08-02 RX ORDER — FLUTICASONE PROPIONATE AND SALMETEROL 500; 50 UG/1; UG/1
1 POWDER RESPIRATORY (INHALATION) 2 TIMES DAILY
Qty: 60 EACH | Refills: 5 | Status: SHIPPED | OUTPATIENT
Start: 2023-08-02

## 2023-08-02 RX ORDER — FLUTICASONE PROPIONATE AND SALMETEROL 500; 50 UG/1; UG/1
1 POWDER RESPIRATORY (INHALATION) 2 TIMES DAILY
COMMUNITY
Start: 2019-09-23 | End: 2023-08-02 | Stop reason: SDUPTHER

## 2023-08-02 ASSESSMENT — PATIENT HEALTH QUESTIONNAIRE - PHQ9
SUM OF ALL RESPONSES TO PHQ9 QUESTIONS 1 & 2: 0
2. FEELING DOWN, DEPRESSED OR HOPELESS: 0
1. LITTLE INTEREST OR PLEASURE IN DOING THINGS: 0
SUM OF ALL RESPONSES TO PHQ QUESTIONS 1-9: 0

## 2023-08-17 ENCOUNTER — HOSPITAL ENCOUNTER (OUTPATIENT)
Facility: HOSPITAL | Age: 28
Setting detail: SPECIMEN
Discharge: HOME | End: 2023-08-17
Payer: COMMERCIAL

## 2023-08-17 DIAGNOSIS — Z13.220 SCREENING FOR CHOLESTEROL LEVEL: ICD-10-CM

## 2023-08-17 DIAGNOSIS — Z00.00 PREVENTATIVE HEALTH CARE: ICD-10-CM

## 2023-08-17 DIAGNOSIS — Z83.49 FAMILY HISTORY OF THYROID DISEASE IN MOTHER: ICD-10-CM

## 2023-08-17 DIAGNOSIS — Z13.0 SCREENING FOR DEFICIENCY ANEMIA: ICD-10-CM

## 2023-08-17 DIAGNOSIS — Z13.1 SCREENING FOR DIABETES MELLITUS (DM): ICD-10-CM

## 2023-08-17 DIAGNOSIS — Z13.29 SCREENING FOR THYROID DISORDER: ICD-10-CM

## 2023-08-17 LAB
ALBUMIN SERPL-MCNC: 3.6 G/DL (ref 3.4–5)
ALBUMIN/GLOB SERPL: 0.8 (ref 0.8–1.7)
ALP SERPL-CCNC: 78 U/L (ref 45–117)
ALT SERPL-CCNC: 28 U/L (ref 13–56)
ANION GAP SERPL CALC-SCNC: 4 MMOL/L (ref 3–18)
AST SERPL-CCNC: 15 U/L (ref 10–38)
BASOPHILS # BLD: 0.1 K/UL (ref 0–0.1)
BASOPHILS NFR BLD: 1 % (ref 0–2)
BILIRUB SERPL-MCNC: 0.3 MG/DL (ref 0.2–1)
BUN SERPL-MCNC: 13 MG/DL (ref 7–18)
BUN/CREAT SERPL: 19 (ref 12–20)
CALCIUM SERPL-MCNC: 8.8 MG/DL (ref 8.5–10.1)
CHLORIDE SERPL-SCNC: 106 MMOL/L (ref 100–111)
CHOLEST SERPL-MCNC: 195 MG/DL
CO2 SERPL-SCNC: 27 MMOL/L (ref 21–32)
CREAT SERPL-MCNC: 0.69 MG/DL (ref 0.6–1.3)
DIFFERENTIAL METHOD BLD: ABNORMAL
EOSINOPHIL # BLD: 0.4 K/UL (ref 0–0.4)
EOSINOPHIL NFR BLD: 4 % (ref 0–5)
ERYTHROCYTE [DISTWIDTH] IN BLOOD BY AUTOMATED COUNT: 13.1 % (ref 11.6–14.5)
EST. AVERAGE GLUCOSE BLD GHB EST-MCNC: 108 MG/DL
GLOBULIN SER CALC-MCNC: 4.3 G/DL (ref 2–4)
GLUCOSE SERPL-MCNC: 86 MG/DL (ref 74–99)
HBA1C MFR BLD: 5.4 % (ref 4.2–5.6)
HCT VFR BLD AUTO: 39.6 % (ref 35–45)
HDLC SERPL-MCNC: 50 MG/DL (ref 40–60)
HDLC SERPL: 3.9 (ref 0–5)
HGB BLD-MCNC: 12.2 G/DL (ref 12–16)
IMM GRANULOCYTES # BLD AUTO: 0.1 K/UL (ref 0–0.04)
IMM GRANULOCYTES NFR BLD AUTO: 1 % (ref 0–0.5)
LDLC SERPL CALC-MCNC: 93 MG/DL (ref 0–100)
LIPID PANEL: ABNORMAL
LYMPHOCYTES # BLD: 3 K/UL (ref 0.9–3.6)
LYMPHOCYTES NFR BLD: 30 % (ref 21–52)
MCH RBC QN AUTO: 27.4 PG (ref 24–34)
MCHC RBC AUTO-ENTMCNC: 30.8 G/DL (ref 31–37)
MCV RBC AUTO: 88.8 FL (ref 78–100)
MONOCYTES # BLD: 0.8 K/UL (ref 0.05–1.2)
MONOCYTES NFR BLD: 8 % (ref 3–10)
NEUTS SEG # BLD: 5.6 K/UL (ref 1.8–8)
NEUTS SEG NFR BLD: 57 % (ref 40–73)
NRBC # BLD: 0 K/UL (ref 0–0.01)
NRBC BLD-RTO: 0 PER 100 WBC
PLATELET # BLD AUTO: 441 K/UL (ref 135–420)
PMV BLD AUTO: 10.3 FL (ref 9.2–11.8)
POTASSIUM SERPL-SCNC: 4.1 MMOL/L (ref 3.5–5.5)
PROT SERPL-MCNC: 7.9 G/DL (ref 6.4–8.2)
RBC # BLD AUTO: 4.46 M/UL (ref 4.2–5.3)
SODIUM SERPL-SCNC: 137 MMOL/L (ref 136–145)
T4 FREE SERPL-MCNC: 1.3 NG/DL (ref 0.7–1.5)
TRIGL SERPL-MCNC: 260 MG/DL
TSH SERPL DL<=0.05 MIU/L-ACNC: 1.07 UIU/ML (ref 0.36–3.74)
VLDLC SERPL CALC-MCNC: 52 MG/DL
WBC # BLD AUTO: 9.9 K/UL (ref 4.6–13.2)

## 2023-08-17 PROCEDURE — 80061 LIPID PANEL: CPT

## 2023-08-17 PROCEDURE — 84443 ASSAY THYROID STIM HORMONE: CPT

## 2023-08-17 PROCEDURE — 84439 ASSAY OF FREE THYROXINE: CPT

## 2023-08-17 PROCEDURE — 36415 COLL VENOUS BLD VENIPUNCTURE: CPT

## 2023-08-17 PROCEDURE — 83036 HEMOGLOBIN GLYCOSYLATED A1C: CPT

## 2023-08-17 PROCEDURE — 80053 COMPREHEN METABOLIC PANEL: CPT

## 2023-08-17 PROCEDURE — 85025 COMPLETE CBC W/AUTO DIFF WBC: CPT

## 2023-10-11 DIAGNOSIS — R79.89 ELEVATED PLATELET COUNT: Primary | ICD-10-CM

## 2023-10-11 RX ORDER — ASPIRIN 81 MG/1
81 TABLET ORAL DAILY
Qty: 90 TABLET | Refills: 1 | Status: SHIPPED | OUTPATIENT
Start: 2023-10-11

## 2024-01-04 DIAGNOSIS — J45.40 MODERATE PERSISTENT ASTHMA WITHOUT COMPLICATION: Primary | ICD-10-CM

## 2024-01-04 RX ORDER — TIOTROPIUM BROMIDE 18 UG/1
18 CAPSULE ORAL; RESPIRATORY (INHALATION) DAILY
Qty: 90 CAPSULE | Refills: 2 | Status: SHIPPED | OUTPATIENT
Start: 2024-01-04

## 2024-03-05 DIAGNOSIS — J45.40 MODERATE PERSISTENT ASTHMA WITHOUT COMPLICATION: ICD-10-CM

## 2024-03-07 ENCOUNTER — PATIENT MESSAGE (OUTPATIENT)
Facility: CLINIC | Age: 29
End: 2024-03-07

## 2024-03-07 RX ORDER — FLUTICASONE PROPIONATE AND SALMETEROL 500; 50 UG/1; UG/1
POWDER RESPIRATORY (INHALATION)
COMMUNITY
Start: 2024-02-01 | End: 2024-03-07 | Stop reason: SDUPTHER

## 2024-03-07 RX ORDER — LIDOCAINE 50 MG/G
PATCH TOPICAL
COMMUNITY

## 2024-03-07 NOTE — TELEPHONE ENCOUNTER
Medication(s) requesting:   Requested Prescriptions     Pending Prescriptions Disp Refills    fluticasone-salmeterol (ADVAIR) 500-50 MCG/ACT AEPB diskus inhaler 60 each 3     Sig: INHALE 1 DOSE BY MOUTH TWICE DAILY       Last office visit:  8/2/23  Next office visit DMA: Visit date not found  FUTURE APPT:   Future Appointments   Date Time Provider Department Center   6/6/2024 10:00 AM Alma Malin DO [facility]

## 2024-03-07 NOTE — TELEPHONE ENCOUNTER
From: Argelia Jacobs  To: Hali Alvares  Sent: 3/7/2024 6:45 AM EST  Subject: Advair    Good morning Hali! I was hoping to refill my prescription for my Advair but don’t see it on my list of medications anymore— I know we added spiriva but I was hoping to continue my advair as it works as long as I take it as scheduled. Thank you!!

## 2024-03-08 RX ORDER — FLUTICASONE PROPIONATE AND SALMETEROL 500; 50 UG/1; UG/1
POWDER RESPIRATORY (INHALATION)
Qty: 60 EACH | Refills: 3 | Status: SHIPPED | OUTPATIENT
Start: 2024-03-08

## 2024-03-08 RX ORDER — ALBUTEROL SULFATE 90 UG/1
2 AEROSOL, METERED RESPIRATORY (INHALATION) EVERY 4 HOURS PRN
Qty: 9 G | Refills: 1 | Status: SHIPPED | OUTPATIENT
Start: 2024-03-08

## 2024-03-08 NOTE — TELEPHONE ENCOUNTER
Medication(s) requesting:   Requested Prescriptions     Pending Prescriptions Disp Refills    albuterol sulfate HFA (PROVENTIL;VENTOLIN;PROAIR) 108 (90 Base) MCG/ACT inhaler [Pharmacy Med Name: Albuterol Sulfate  (90 Base) MCG/ACT Inhalation Aerosol Solution] 9 g 1     Sig: INHALE 2 PUFFS BY MOUTH EVERY 4 HOURS AS NEEDED FOR WHEEZING       Last office visit:  8/2/23  Next office visit DMA: Visit date not found  FUTURE APPT:   Future Appointments   Date Time Provider Department Center   6/6/2024 10:00 AM Alma Malin DO [facility]

## 2024-03-11 RX ORDER — FLUTICASONE PROPIONATE AND SALMETEROL 500; 50 UG/1; UG/1
POWDER RESPIRATORY (INHALATION)
Qty: 60 EACH | Refills: 3 | OUTPATIENT
Start: 2024-03-11

## 2024-07-10 RX ORDER — FLUTICASONE PROPIONATE AND SALMETEROL 500; 50 UG/1; UG/1
POWDER RESPIRATORY (INHALATION)
Qty: 60 EACH | Refills: 1 | Status: SHIPPED | OUTPATIENT
Start: 2024-07-10

## 2024-09-16 RX ORDER — FLUTICASONE PROPIONATE AND SALMETEROL 500; 50 UG/1; UG/1
POWDER RESPIRATORY (INHALATION)
Qty: 60 EACH | Refills: 0 | Status: SHIPPED | OUTPATIENT
Start: 2024-09-16

## 2024-09-16 RX ORDER — FLUTICASONE PROPIONATE AND SALMETEROL 500; 50 UG/1; UG/1
POWDER RESPIRATORY (INHALATION)
Qty: 60 EACH | Refills: 1 | Status: SHIPPED | OUTPATIENT
Start: 2024-09-16 | End: 2024-09-16

## 2024-09-17 DIAGNOSIS — J45.40 MODERATE PERSISTENT ASTHMA WITHOUT COMPLICATION: ICD-10-CM

## 2024-09-17 RX ORDER — FLUTICASONE PROPIONATE AND SALMETEROL 500; 50 UG/1; UG/1
POWDER RESPIRATORY (INHALATION)
Qty: 60 EACH | Refills: 0 | OUTPATIENT
Start: 2024-09-17

## 2024-09-17 NOTE — TELEPHONE ENCOUNTER
Med Refill    Medication(s) requesting:   Requested Prescriptions        Pending Prescriptions Disp Refills   Montelukast (SINGULAIR) 10 MG tablet 90 tabs 3       Last OV: 8/2/23  Next Appt: N/A

## 2024-09-17 NOTE — TELEPHONE ENCOUNTER
Medication(s) requesting:   Requested Prescriptions     Pending Prescriptions Disp Refills    montelukast (SINGULAIR) 10 MG tablet 90 tablet 3     Sig: Take 1 tablet by mouth daily       Last office visit:  8.2.23  Next office visit DMA: Visit date not found

## 2024-09-18 RX ORDER — MONTELUKAST SODIUM 10 MG/1
10 TABLET ORAL DAILY
Qty: 90 TABLET | Refills: 3 | OUTPATIENT
Start: 2024-09-18

## 2024-10-14 NOTE — TELEPHONE ENCOUNTER
Medication(s) requesting:   Requested Prescriptions     Pending Prescriptions Disp Refills    fluticasone-salmeterol (ADVAIR) 500-50 MCG/ACT AEPB diskus inhaler [Pharmacy Med Name: Fluticasone-Salmeterol 500-50 MCG/ACT Inhalation Aerosol Powder Breath Activated] 60 each 0     Sig: INHALE 1 PUFF BY MOUTH TWICE DAILY       Last office visit:  8.2.24  Next office visit DMA: Visit date not found

## 2024-10-17 RX ORDER — FLUTICASONE PROPIONATE AND SALMETEROL 500; 50 UG/1; UG/1
POWDER RESPIRATORY (INHALATION)
Qty: 60 EACH | Refills: 0 | Status: SHIPPED | OUTPATIENT
Start: 2024-10-17

## 2024-11-04 ENCOUNTER — OFFICE VISIT (OUTPATIENT)
Facility: CLINIC | Age: 29
End: 2024-11-04
Payer: COMMERCIAL

## 2024-11-04 VITALS
OXYGEN SATURATION: 96 % | SYSTOLIC BLOOD PRESSURE: 112 MMHG | WEIGHT: 220 LBS | HEIGHT: 65 IN | HEART RATE: 96 BPM | BODY MASS INDEX: 36.65 KG/M2 | DIASTOLIC BLOOD PRESSURE: 78 MMHG | TEMPERATURE: 97 F

## 2024-11-04 DIAGNOSIS — J18.9 PNEUMONIA DUE TO INFECTIOUS ORGANISM, UNSPECIFIED LATERALITY, UNSPECIFIED PART OF LUNG: Primary | ICD-10-CM

## 2024-11-04 LAB
EXP DATE SOLUTION: NORMAL
EXP DATE SWAB: NORMAL
EXPIRATION DATE: NORMAL
GROUP A STREP ANTIGEN, POC: NEGATIVE
LOT NUMBER POC: NORMAL
LOT NUMBER SOLUTION: NORMAL
LOT NUMBER SWAB: NORMAL
QUICKVUE INFLUENZA TEST: NEGATIVE
SARS-COV-2 RNA, POC: NEGATIVE
VALID INTERNAL CONTROL, POC: YES
VALID INTERNAL CONTROL, POC: YES

## 2024-11-04 PROCEDURE — 87635 SARS-COV-2 COVID-19 AMP PRB: CPT

## 2024-11-04 PROCEDURE — 99214 OFFICE O/P EST MOD 30 MIN: CPT

## 2024-11-04 PROCEDURE — 87880 STREP A ASSAY W/OPTIC: CPT

## 2024-11-04 PROCEDURE — 87804 INFLUENZA ASSAY W/OPTIC: CPT

## 2024-11-04 RX ORDER — AZITHROMYCIN 250 MG/1
TABLET, FILM COATED ORAL
Qty: 6 TABLET | Refills: 0 | Status: SHIPPED | OUTPATIENT
Start: 2024-11-04 | End: 2024-11-14

## 2024-11-04 SDOH — ECONOMIC STABILITY: FOOD INSECURITY: WITHIN THE PAST 12 MONTHS, YOU WORRIED THAT YOUR FOOD WOULD RUN OUT BEFORE YOU GOT MONEY TO BUY MORE.: NEVER TRUE

## 2024-11-04 SDOH — ECONOMIC STABILITY: FOOD INSECURITY: WITHIN THE PAST 12 MONTHS, THE FOOD YOU BOUGHT JUST DIDN'T LAST AND YOU DIDN'T HAVE MONEY TO GET MORE.: NEVER TRUE

## 2024-11-04 SDOH — ECONOMIC STABILITY: INCOME INSECURITY: HOW HARD IS IT FOR YOU TO PAY FOR THE VERY BASICS LIKE FOOD, HOUSING, MEDICAL CARE, AND HEATING?: NOT HARD AT ALL

## 2024-11-04 ASSESSMENT — ENCOUNTER SYMPTOMS
EYES NEGATIVE: 1
CONSTIPATION: 0
SORE THROAT: 0
DIARRHEA: 0
TROUBLE SWALLOWING: 0
SHORTNESS OF BREATH: 1
CHEST TIGHTNESS: 0
VOMITING: 0
BLOOD IN STOOL: 0
WHEEZING: 0
COUGH: 1
NAUSEA: 0
ABDOMINAL PAIN: 0

## 2024-11-04 ASSESSMENT — ANXIETY QUESTIONNAIRES
1. FEELING NERVOUS, ANXIOUS, OR ON EDGE: NEARLY EVERY DAY
3. WORRYING TOO MUCH ABOUT DIFFERENT THINGS: NEARLY EVERY DAY
6. BECOMING EASILY ANNOYED OR IRRITABLE: NEARLY EVERY DAY
IF YOU CHECKED OFF ANY PROBLEMS ON THIS QUESTIONNAIRE, HOW DIFFICULT HAVE THESE PROBLEMS MADE IT FOR YOU TO DO YOUR WORK, TAKE CARE OF THINGS AT HOME, OR GET ALONG WITH OTHER PEOPLE: SOMEWHAT DIFFICULT
5. BEING SO RESTLESS THAT IT IS HARD TO SIT STILL: NEARLY EVERY DAY
2. NOT BEING ABLE TO STOP OR CONTROL WORRYING: NEARLY EVERY DAY
7. FEELING AFRAID AS IF SOMETHING AWFUL MIGHT HAPPEN: NEARLY EVERY DAY
4. TROUBLE RELAXING: NEARLY EVERY DAY
GAD7 TOTAL SCORE: 21

## 2024-11-04 ASSESSMENT — PATIENT HEALTH QUESTIONNAIRE - PHQ9
SUM OF ALL RESPONSES TO PHQ QUESTIONS 1-9: 0
SUM OF ALL RESPONSES TO PHQ QUESTIONS 1-9: 0
1. LITTLE INTEREST OR PLEASURE IN DOING THINGS: NOT AT ALL
SUM OF ALL RESPONSES TO PHQ QUESTIONS 1-9: 0
2. FEELING DOWN, DEPRESSED OR HOPELESS: NOT AT ALL
SUM OF ALL RESPONSES TO PHQ9 QUESTIONS 1 & 2: 0
SUM OF ALL RESPONSES TO PHQ QUESTIONS 1-9: 0

## 2024-11-04 NOTE — PROGRESS NOTES
Argelia Jacobs is a 29 y.o. year old female who presents today for   Chief Complaint   Patient presents with    Cough        \"Have you been to the ER, urgent care clinic since your last visit?  Hospitalized since your last visit?\"   NO     “Have you seen or consulted any other health care providers outside our system since your last visit?”   NO      “Have you had a pap smear?”    NO    No cervical cancer screening on file             - NICOLASA Mccloud  [facility]  Phone: [phone]  Fax: [phone]  
Mental Status: She is alert and oriented to person, place, and time. Mental status is at baseline.      Motor: No weakness.      Coordination: Coordination normal.      Gait: Gait normal.   Psychiatric:         Mood and Affect: Mood normal.         Behavior: Behavior normal.         Thought Content: Thought content normal.         Judgment: Judgment normal.             Assessment and Plan:       1. Pneumonia due to infectious organism, unspecified laterality, unspecified part of lung  -     AMB POC RAPID INFLUENZA TEST  -     AMB POC COVID-19 COV  -     AMB POC RAPID STREP A  -     azithromycin (ZITHROMAX) 250 MG tablet; 500mg on day 1 followed by 250mg on days 2 - 5, Disp-6 tablet, R-0Normal  -     XR CHEST (2 VIEWS); Future             Return in about 1 month (around 12/4/2024) for chronic cough.          On this date 11/04/2024  I have reviewed previous notes, test results and face to face with the patient discussing the diagnosis and treatment plan as well as documenting the visit. The patient has received an After-Visit Summary for today's visit.       Electronically signed  Hali Alvares, Family Nurse Practitioner     Please note: This document has been created using a voice recognition software. Unrecognized errors in transcription may be present.

## 2024-11-12 RX ORDER — FLUTICASONE PROPIONATE AND SALMETEROL 500; 50 UG/1; UG/1
POWDER RESPIRATORY (INHALATION)
Qty: 60 EACH | Refills: 0 | Status: SHIPPED | OUTPATIENT
Start: 2024-11-12

## 2024-11-12 NOTE — TELEPHONE ENCOUNTER
Medication(s) requesting:   Requested Prescriptions     Pending Prescriptions Disp Refills    fluticasone-salmeterol (ADVAIR) 500-50 MCG/ACT AEPB diskus inhaler [Pharmacy Med Name: Fluticasone-Salmeterol 500-50 MCG/ACT Inhalation Aerosol Powder Breath Activated] 60 each 0     Sig: INHALE 1 PUFF BY MOUTH TWICE DAILY       Last office visit:  11.4.24  Next office visit DMA: Visit date not found

## 2024-11-19 ENCOUNTER — TELEPHONE (OUTPATIENT)
Facility: CLINIC | Age: 29
End: 2024-11-19

## 2024-11-19 NOTE — TELEPHONE ENCOUNTER
LVM to schedule 1 Month f/u to chronic cough    Please further assist if the pt returns the call.    Thank you!

## 2024-12-16 ENCOUNTER — OFFICE VISIT (OUTPATIENT)
Facility: CLINIC | Age: 29
End: 2024-12-16
Payer: COMMERCIAL

## 2024-12-16 VITALS
WEIGHT: 222 LBS | DIASTOLIC BLOOD PRESSURE: 79 MMHG | SYSTOLIC BLOOD PRESSURE: 111 MMHG | BODY MASS INDEX: 36.99 KG/M2 | HEART RATE: 91 BPM | OXYGEN SATURATION: 95 % | HEIGHT: 65 IN | TEMPERATURE: 97.2 F

## 2024-12-16 DIAGNOSIS — R49.0 VOICE HOARSENESS: Primary | ICD-10-CM

## 2024-12-16 PROCEDURE — 99212 OFFICE O/P EST SF 10 MIN: CPT

## 2024-12-16 NOTE — TELEPHONE ENCOUNTER
Medication(s) requesting:   Requested Prescriptions     Pending Prescriptions Disp Refills    fluticasone-salmeterol (ADVAIR) 500-50 MCG/ACT AEPB diskus inhaler [Pharmacy Med Name: Fluticasone-Salmeterol 500-50 MCG/ACT Inhalation Aerosol Powder Breath Activated] 60 each 0     Sig: INHALE 1 PUFF BY MOUTH TWICE DAILY       Last office visit:  11.4.24  Next office visit DMA: 12/16/2024

## 2024-12-16 NOTE — PROGRESS NOTES
Patient ID: Argelia Jacobs is a 29 y.o. female established patient presents for the following:      Subjective:     Primary historian: patient    Follow-up     Past Medical History:   Diagnosis Date    Allergic rhinitis 2018?    Anxiety 2012    Asthma        Past Surgical History:   Procedure Laterality Date    DILATION AND CURETTAGE OF UTERUS         Current Outpatient Medications   Medication Sig Dispense Refill    albuterol sulfate HFA (PROVENTIL;VENTOLIN;PROAIR) 108 (90 Base) MCG/ACT inhaler INHALE 2 PUFFS BY MOUTH EVERY 4 HOURS AS NEEDED FOR WHEEZING 9 g 1    aspirin 81 MG EC tablet Take 1 tablet by mouth daily 90 tablet 1    fluticasone-salmeterol (ADVAIR) 500-50 MCG/ACT AEPB diskus inhaler INHALE 1 PUFF BY MOUTH TWICE DAILY 60 each 0    lidocaine (LIDODERM) 5 % APPLY ONE PATCH TOPICALLY TO CLEAN, DRY SKIN. LEAVE ON FOR 12 HOURS THEN REMOVE. MUST WAIT AT LEAST 12 HOURS BEFORE APPLYING PATCH(ES) AGAIN.      tiotropium (SPIRIVA HANDIHALER) 18 MCG inhalation capsule Inhale 1 capsule into the lungs daily 90 capsule 2    montelukast (SINGULAIR) 10 MG tablet Take 1 tablet by mouth daily 90 tablet 3    Melatonin 5 MG SUBL Place 5 mg under the tongue daily       No current facility-administered medications for this visit.          ROS   Review of Systems   Constitutional:  Negative for chills, fatigue and fever.   HENT:  Positive for voice change (chronic). Negative for ear pain, hearing loss, sore throat and trouble swallowing.    Eyes: Negative.    Respiratory:  Negative for cough, chest tightness, shortness of breath and wheezing.    Cardiovascular:  Negative for chest pain, palpitations and leg swelling.   Gastrointestinal:  Negative for abdominal pain, blood in stool, constipation, diarrhea, nausea and vomiting.   Endocrine: Negative for polydipsia, polyphagia and polyuria.   Genitourinary:  Negative for flank pain, hematuria, pelvic pain, vaginal bleeding, vaginal discharge and vaginal pain.   Skin:

## 2024-12-16 NOTE — PROGRESS NOTES
Argelia Jacobs is a 29 y.o. year old female who presents today for No chief complaint on file.       \"Have you been to the ER, urgent care clinic since your last visit?  Hospitalized since your last visit?\"   NO     “Have you seen or consulted any other health care providers outside our system since your last visit?”   NO      “Have you had a pap smear?”    NO    No cervical cancer screening on file             - NICOLASA Mccloud  [facility]  Phone: [phone]  Fax: [phone]

## 2024-12-17 RX ORDER — FLUTICASONE PROPIONATE AND SALMETEROL 500; 50 UG/1; UG/1
POWDER RESPIRATORY (INHALATION)
Qty: 60 EACH | Refills: 0 | Status: SHIPPED | OUTPATIENT
Start: 2024-12-17

## 2025-01-04 PROBLEM — R49.0 VOICE HOARSENESS: Status: ACTIVE | Noted: 2025-01-04

## 2025-01-04 ASSESSMENT — ENCOUNTER SYMPTOMS
CONSTIPATION: 0
VOMITING: 0
EYES NEGATIVE: 1
VOICE CHANGE: 1
ABDOMINAL PAIN: 0
SORE THROAT: 0
BLOOD IN STOOL: 0
NAUSEA: 0
SHORTNESS OF BREATH: 0
COUGH: 0
WHEEZING: 0
TROUBLE SWALLOWING: 0
CHEST TIGHTNESS: 0
DIARRHEA: 0

## 2025-01-13 RX ORDER — FLUTICASONE PROPIONATE AND SALMETEROL 500; 50 UG/1; UG/1
POWDER RESPIRATORY (INHALATION)
Qty: 60 EACH | Refills: 0 | Status: SHIPPED | OUTPATIENT
Start: 2025-01-13

## 2025-02-17 NOTE — TELEPHONE ENCOUNTER
Medication(s) requesting:   Requested Prescriptions     Pending Prescriptions Disp Refills    fluticasone-salmeterol (ADVAIR) 500-50 MCG/ACT AEPB diskus inhaler [Pharmacy Med Name: Fluticasone-Salmeterol 500-50 MCG/ACT Inhalation Aerosol Powder Breath Activated] 60 each 0     Sig: INHALE 1 PUFF BY MOUTH TWICE DAILY       Last office visit:  12/16/24  Next office visit DMA: Visit date not found

## 2025-02-18 DIAGNOSIS — J45.40 MODERATE PERSISTENT ASTHMA WITHOUT COMPLICATION: ICD-10-CM

## 2025-02-18 NOTE — TELEPHONE ENCOUNTER
Medication requested :   Requested Prescriptions     Pending Prescriptions Disp Refills    VENTOLIN  (90 Base) MCG/ACT inhaler [Pharmacy Med Name: Ventolin  (90 Base) MCG/ACT Inhalation Aerosol Solution] 18 g 0     Sig: INHALE 2 PUFFS BY MOUTH EVERY 4 HOURS AS NEEDED FOR WHEEZING      PCP: Hali Alvares NP-C  LOV:           12/16/2024 NOV DMA: Visit date not found  FUTURE APPT: No future appointments.    Thank you.

## 2025-02-21 RX ORDER — ALBUTEROL SULFATE 90 UG/1
2 AEROSOL, METERED RESPIRATORY (INHALATION) EVERY 4 HOURS PRN
Qty: 18 G | Refills: 0 | Status: SHIPPED | OUTPATIENT
Start: 2025-02-21

## 2025-02-21 RX ORDER — FLUTICASONE PROPIONATE AND SALMETEROL 500; 50 UG/1; UG/1
POWDER RESPIRATORY (INHALATION)
Qty: 60 EACH | Refills: 0 | Status: SHIPPED | OUTPATIENT
Start: 2025-02-21 | End: 2025-02-23 | Stop reason: SDUPTHER

## 2025-02-23 RX ORDER — FLUTICASONE PROPIONATE AND SALMETEROL 500; 50 UG/1; UG/1
POWDER RESPIRATORY (INHALATION)
Qty: 60 EACH | Refills: 5 | Status: SHIPPED | OUTPATIENT
Start: 2025-02-23

## 2025-04-16 ENCOUNTER — TELEPHONE (OUTPATIENT)
Facility: CLINIC | Age: 30
End: 2025-04-16

## 2025-04-17 ENCOUNTER — HOSPITAL ENCOUNTER (OUTPATIENT)
Facility: HOSPITAL | Age: 30
Setting detail: SPECIMEN
Discharge: HOME | End: 2025-04-20

## 2025-04-17 ENCOUNTER — OFFICE VISIT (OUTPATIENT)
Facility: CLINIC | Age: 30
End: 2025-04-17
Payer: COMMERCIAL

## 2025-04-17 VITALS
SYSTOLIC BLOOD PRESSURE: 115 MMHG | RESPIRATION RATE: 18 BRPM | TEMPERATURE: 98.2 F | BODY MASS INDEX: 34.07 KG/M2 | HEIGHT: 66 IN | WEIGHT: 212 LBS | OXYGEN SATURATION: 98 % | HEART RATE: 87 BPM | DIASTOLIC BLOOD PRESSURE: 81 MMHG

## 2025-04-17 DIAGNOSIS — Z13.89 SCREENING FOR BLOOD OR PROTEIN IN URINE: ICD-10-CM

## 2025-04-17 DIAGNOSIS — Z13.0 SCREENING FOR DEFICIENCY ANEMIA: ICD-10-CM

## 2025-04-17 DIAGNOSIS — Z13.228 SCREENING FOR METABOLIC DISORDER: ICD-10-CM

## 2025-04-17 DIAGNOSIS — R00.2 HEART PALPITATIONS: Primary | ICD-10-CM

## 2025-04-17 DIAGNOSIS — F41.9 ANXIETY: ICD-10-CM

## 2025-04-17 DIAGNOSIS — Z13.29 SCREENING FOR THYROID DISORDER: ICD-10-CM

## 2025-04-17 DIAGNOSIS — Z13.220 SCREENING FOR CHOLESTEROL LEVEL: ICD-10-CM

## 2025-04-17 LAB — SENTARA SPECIMEN COLLECTION: NORMAL

## 2025-04-17 PROCEDURE — 99001 SPECIMEN HANDLING PT-LAB: CPT

## 2025-04-17 PROCEDURE — 99214 OFFICE O/P EST MOD 30 MIN: CPT | Performed by: NURSE PRACTITIONER

## 2025-04-17 RX ORDER — LORAZEPAM 0.5 MG/1
0.5 TABLET ORAL PRN
Qty: 15 TABLET | Refills: 0 | Status: SHIPPED | OUTPATIENT
Start: 2025-04-17 | End: 2025-05-17

## 2025-04-17 RX ORDER — BUSPIRONE HYDROCHLORIDE 7.5 MG/1
7.5 TABLET ORAL 3 TIMES DAILY
Qty: 90 TABLET | Refills: 2 | Status: SHIPPED | OUTPATIENT
Start: 2025-04-17 | End: 2025-07-16

## 2025-04-17 SDOH — ECONOMIC STABILITY: FOOD INSECURITY: WITHIN THE PAST 12 MONTHS, YOU WORRIED THAT YOUR FOOD WOULD RUN OUT BEFORE YOU GOT MONEY TO BUY MORE.: NEVER TRUE

## 2025-04-17 SDOH — ECONOMIC STABILITY: FOOD INSECURITY: WITHIN THE PAST 12 MONTHS, THE FOOD YOU BOUGHT JUST DIDN'T LAST AND YOU DIDN'T HAVE MONEY TO GET MORE.: NEVER TRUE

## 2025-04-17 SDOH — ECONOMIC STABILITY: INCOME INSECURITY: IN THE LAST 12 MONTHS, WAS THERE A TIME WHEN YOU WERE NOT ABLE TO PAY THE MORTGAGE OR RENT ON TIME?: NO

## 2025-04-17 SDOH — ECONOMIC STABILITY: TRANSPORTATION INSECURITY
IN THE PAST 12 MONTHS, HAS THE LACK OF TRANSPORTATION KEPT YOU FROM MEDICAL APPOINTMENTS OR FROM GETTING MEDICATIONS?: NO

## 2025-04-17 SDOH — ECONOMIC STABILITY: TRANSPORTATION INSECURITY
IN THE PAST 12 MONTHS, HAS LACK OF TRANSPORTATION KEPT YOU FROM MEETINGS, WORK, OR FROM GETTING THINGS NEEDED FOR DAILY LIVING?: NO

## 2025-04-17 ASSESSMENT — PATIENT HEALTH QUESTIONNAIRE - PHQ9
SUM OF ALL RESPONSES TO PHQ QUESTIONS 1-9: 0
1. LITTLE INTEREST OR PLEASURE IN DOING THINGS: NOT AT ALL
SUM OF ALL RESPONSES TO PHQ QUESTIONS 1-9: 0
2. FEELING DOWN, DEPRESSED OR HOPELESS: NOT AT ALL

## 2025-04-17 NOTE — PROGRESS NOTES
Argelia Jacobs is a 29 y.o. female  established patient, here for evaluation of the following chief complaint(s):  Chief Complaint   Patient presents with    Anxiety      Assessment and Plan  1. Heart palpitations  -     [facility] - [place] Pandey MD, Cardiac Testing, [place] [address]  2. Anxiety  -     [facility] - [place] Pandey MD, Cardiac Testing, [place] ([address]  -     busPIRone (BUSPAR) 7.5 MG tablet; Take 1 tablet by mouth 3 times daily, Disp-90 tablet, R-2Normal  -     LORazepam (ATIVAN) 0.5 MG tablet; Take 1 tablet by mouth as needed for Anxiety for up to 30 days., Disp-15 tablet, R-0Normal  3. Screening for blood or protein in urine  4. Screening for metabolic disorder  -     Comprehensive Metabolic Panel; Future  5. Screening for cholesterol level  -     Lipid Panel; Future  6. Screening for thyroid disorder  -     TSH; Future  -     T4, Free; Future  7. Screening for deficiency anemia  -     CBC with Auto Differential; Future       Return in about 1 month (around 5/17/2025), or if symptoms worsen or fail to improve, for Follow-up with your PCP.   HPI:   In office visit related to chronic heart palpitations. She has had a \"presyncope feeling\" but her watch didn't report an arrhythmia. She did a EKG at her work and it was normal. 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 She has worn a heart monitor maybe 3 years ago but go no results back. She was suppose to have a echo but didn't go. Pt has a family history of anxiety. Pt drinks a lot of water but may need more. She has cut her caffeine.  She takes a sleep supplement at night that has magnesium in it.  Declined a EKG here.  She will see cardiology.    Ordered routine labs.  Thyroid labs have been normal in the past.    Asthma is stable. On Advair, rarely uses albuterol.    LMP: 3/22/2025, \"not pregnant\", no birth control.  Has a boyfriend.    Patient has a trip to [place] plan and she is excited.    ROS:    General: negative for - chills, fever,

## 2025-04-18 LAB
A/G RATIO: 1.2 RATIO (ref 1.1–2.6)
ALBUMIN: 4.5 G/DL (ref 3.5–5)
ALP BLD-CCNC: 64 U/L (ref 25–115)
ALT SERPL-CCNC: 14 U/L (ref 5–40)
ANION GAP SERPL CALCULATED.3IONS-SCNC: 10 MMOL/L (ref 3–15)
AST SERPL-CCNC: 16 U/L (ref 10–37)
BASOPHILS # BLD: 1 % (ref 0–2)
BASOPHILS ABSOLUTE: 0.1 K/UL (ref 0–0.2)
BILIRUB SERPL-MCNC: 0.2 MG/DL (ref 0.2–1.2)
BUN BLDV-MCNC: 12 MG/DL (ref 6–22)
CALCIUM SERPL-MCNC: 9.8 MG/DL (ref 8.4–10.5)
CHLORIDE BLD-SCNC: 104 MMOL/L (ref 98–110)
CHOLESTEROL, TOTAL: 169 MG/DL (ref 110–200)
CHOLESTEROL/HDL RATIO: 3.8 (ref 0–5)
CO2: 26 MMOL/L (ref 20–32)
CREAT SERPL-MCNC: 0.6 MG/DL (ref 0.5–1.2)
EOSINOPHIL # BLD: 3 % (ref 0–6)
EOSINOPHILS ABSOLUTE: 0.2 K/UL (ref 0–0.5)
GFR, ESTIMATED: >60 ML/MIN/1.73 SQ.M.
GLOBULIN: 3.7 G/DL (ref 2–4)
GLUCOSE: 99 MG/DL (ref 70–99)
HCT VFR BLD CALC: 40.7 % (ref 35.1–46.5)
HDLC SERPL-MCNC: 45 MG/DL
HEMOGLOBIN: 12.9 G/DL (ref 11.7–15.5)
LDL CHOLESTEROL: 103 MG/DL (ref 50–99)
LDL/HDL RATIO: 2.3
LYMPHOCYTES # BLD: 26 % (ref 20–45)
LYMPHOCYTES ABSOLUTE: 2.2 K/UL (ref 1–4.8)
MCH RBC QN AUTO: 28 PG (ref 26–34)
MCHC RBC AUTO-ENTMCNC: 32 G/DL (ref 31–36)
MCV RBC AUTO: 89 FL (ref 80–99)
MONOCYTES ABSOLUTE: 0.7 K/UL (ref 0.1–1)
MONOCYTES: 8 % (ref 3–12)
NEUTROPHILS ABSOLUTE: 5.3 K/UL (ref 1.8–7.7)
NEUTROPHILS SEGMENTED: 63 % (ref 40–75)
NON-HDL CHOLESTEROL: 124 MG/DL
PDW BLD-RTO: 13.8 % (ref 10–15.5)
PLATELET # BLD: 423 K/UL (ref 140–440)
PMV BLD AUTO: 10.5 FL (ref 9–13)
POTASSIUM SERPL-SCNC: 4 MMOL/L (ref 3.5–5.5)
RBC # BLD: 4.56 M/UL (ref 3.8–5.2)
SODIUM BLD-SCNC: 140 MMOL/L (ref 133–145)
T4 FREE: 1.4 NG/DL (ref 0.9–1.8)
TOTAL PROTEIN: 8.2 G/DL (ref 6.4–8.3)
TRIGL SERPL-MCNC: 103 MG/DL (ref 40–149)
TSH SERPL DL<=0.05 MIU/L-ACNC: 0.58 MCU/ML (ref 0.27–4.2)
VLDLC SERPL CALC-MCNC: 21 MG/DL (ref 8–30)
WBC # BLD: 8.5 K/UL (ref 4–11)

## 2025-06-10 DIAGNOSIS — R00.2 PALPITATIONS: Primary | ICD-10-CM

## 2025-06-11 DIAGNOSIS — R00.2 PALPITATIONS: Primary | ICD-10-CM

## 2025-06-23 ENCOUNTER — TELEPHONE (OUTPATIENT)
Age: 30
End: 2025-06-23

## 2025-07-21 DIAGNOSIS — J45.40 MODERATE PERSISTENT ASTHMA WITHOUT COMPLICATION: ICD-10-CM

## 2025-07-22 RX ORDER — ALBUTEROL SULFATE 90 UG/1
2 AEROSOL, METERED RESPIRATORY (INHALATION) EVERY 4 HOURS PRN
Qty: 18 G | Refills: 0 | Status: SHIPPED | OUTPATIENT
Start: 2025-07-22

## 2025-07-22 NOTE — TELEPHONE ENCOUNTER
Medication(s) requesting:   Requested Prescriptions     Pending Prescriptions Disp Refills    VENTOLIN  (90 Base) MCG/ACT inhaler [Pharmacy Med Name: Ventolin  (90 Base) MCG/ACT Inhalation Aerosol Solution] 18 g 0     Sig: INHALE 2 PUFFS BY MOUTH EVERY 4 HOURS AS NEEDED FOR WHEEZING        Last Appointment:  12/16/2024  No future appointments.